# Patient Record
Sex: MALE | Race: WHITE | Employment: OTHER | URBAN - METROPOLITAN AREA
[De-identification: names, ages, dates, MRNs, and addresses within clinical notes are randomized per-mention and may not be internally consistent; named-entity substitution may affect disease eponyms.]

---

## 2022-04-11 ENCOUNTER — HOSPITAL ENCOUNTER (OUTPATIENT)
Dept: REHABILITATION | Age: 82
Discharge: HOME | End: 2022-04-11
Payer: MEDICARE

## 2022-04-11 ENCOUNTER — HOSPITAL ENCOUNTER (OUTPATIENT)
Dept: SURGERY | Age: 82
Discharge: HOME | End: 2022-04-11
Payer: MEDICARE

## 2022-04-11 VITALS
DIASTOLIC BLOOD PRESSURE: 82 MMHG | SYSTOLIC BLOOD PRESSURE: 159 MMHG | HEIGHT: 72 IN | WEIGHT: 216.3 LBS | HEART RATE: 60 BPM | BODY MASS INDEX: 29.3 KG/M2 | OXYGEN SATURATION: 97 % | TEMPERATURE: 97.5 F

## 2022-04-11 DIAGNOSIS — R06.83 SNORING: Primary | ICD-10-CM

## 2022-04-11 LAB
ANION GAP SERPL CALC-SCNC: 0 MMOL/L (ref 7–16)
APTT PPP: 36.5 SEC (ref 24.1–35.1)
ATRIAL RATE: 69 BPM
BASOPHILS # BLD: 0 K/UL (ref 0–0.2)
BASOPHILS NFR BLD: 1 % (ref 0–2)
BUN SERPL-MCNC: 18 MG/DL (ref 8–23)
CALCIUM SERPL-MCNC: 8.7 MG/DL (ref 8.3–10.4)
CALCULATED P AXIS, ECG09: 79 DEGREES
CALCULATED R AXIS, ECG10: 68 DEGREES
CALCULATED T AXIS, ECG11: 49 DEGREES
CHLORIDE SERPL-SCNC: 108 MMOL/L (ref 98–107)
CO2 SERPL-SCNC: 31 MMOL/L (ref 21–32)
CREAT SERPL-MCNC: 0.82 MG/DL (ref 0.8–1.5)
DIAGNOSIS, 93000: NORMAL
DIFFERENTIAL METHOD BLD: ABNORMAL
EOSINOPHIL # BLD: 0.1 K/UL (ref 0–0.8)
EOSINOPHIL NFR BLD: 3 % (ref 0.5–7.8)
ERYTHROCYTE [DISTWIDTH] IN BLOOD BY AUTOMATED COUNT: 13.2 % (ref 11.9–14.6)
EST. AVERAGE GLUCOSE BLD GHB EST-MCNC: 114 MG/DL
GLUCOSE SERPL-MCNC: 88 MG/DL (ref 65–100)
HBA1C MFR BLD: 5.6 % (ref 4.2–6.3)
HCT VFR BLD AUTO: 36 % (ref 41.1–50.3)
HGB BLD-MCNC: 11.9 G/DL (ref 13.6–17.2)
IMM GRANULOCYTES # BLD AUTO: 0 K/UL (ref 0–0.5)
IMM GRANULOCYTES NFR BLD AUTO: 0 % (ref 0–5)
INR PPP: 1.1
LYMPHOCYTES # BLD: 1.1 K/UL (ref 0.5–4.6)
LYMPHOCYTES NFR BLD: 23 % (ref 13–44)
MCH RBC QN AUTO: 32 PG (ref 26.1–32.9)
MCHC RBC AUTO-ENTMCNC: 33.1 G/DL (ref 31.4–35)
MCV RBC AUTO: 96.8 FL (ref 79.6–97.8)
MONOCYTES # BLD: 0.6 K/UL (ref 0.1–1.3)
MONOCYTES NFR BLD: 12 % (ref 4–12)
NEUTS SEG # BLD: 2.8 K/UL (ref 1.7–8.2)
NEUTS SEG NFR BLD: 61 % (ref 43–78)
NRBC # BLD: 0 K/UL (ref 0–0.2)
P-R INTERVAL, ECG05: 168 MS
PLATELET # BLD AUTO: 137 K/UL (ref 150–450)
PMV BLD AUTO: 10.4 FL (ref 9.4–12.3)
POTASSIUM SERPL-SCNC: 4 MMOL/L (ref 3.5–5.1)
PROTHROMBIN TIME: 14.4 SEC (ref 12.6–14.5)
Q-T INTERVAL, ECG07: 436 MS
QRS DURATION, ECG06: 96 MS
QTC CALCULATION (BEZET), ECG08: 467 MS
RBC # BLD AUTO: 3.72 M/UL (ref 4.23–5.6)
SODIUM SERPL-SCNC: 139 MMOL/L (ref 136–145)
VENTRICULAR RATE, ECG03: 69 BPM
WBC # BLD AUTO: 4.6 K/UL (ref 4.3–11.1)

## 2022-04-11 PROCEDURE — 97161 PT EVAL LOW COMPLEX 20 MIN: CPT

## 2022-04-11 PROCEDURE — 83036 HEMOGLOBIN GLYCOSYLATED A1C: CPT

## 2022-04-11 PROCEDURE — 87641 MR-STAPH DNA AMP PROBE: CPT

## 2022-04-11 PROCEDURE — 77030027138 HC INCENT SPIROMETER -A

## 2022-04-11 PROCEDURE — 85610 PROTHROMBIN TIME: CPT

## 2022-04-11 PROCEDURE — 85025 COMPLETE CBC W/AUTO DIFF WBC: CPT

## 2022-04-11 PROCEDURE — 94760 N-INVAS EAR/PLS OXIMETRY 1: CPT

## 2022-04-11 PROCEDURE — 93005 ELECTROCARDIOGRAM TRACING: CPT

## 2022-04-11 PROCEDURE — 85730 THROMBOPLASTIN TIME PARTIAL: CPT

## 2022-04-11 PROCEDURE — 80048 BASIC METABOLIC PNL TOTAL CA: CPT

## 2022-04-11 RX ORDER — CHOLECALCIFEROL (VITAMIN D3) 125 MCG
CAPSULE ORAL
COMMUNITY

## 2022-04-11 RX ORDER — LANOLIN ALCOHOL/MO/W.PET/CERES
1500 CREAM (GRAM) TOPICAL DAILY
COMMUNITY

## 2022-04-11 RX ORDER — ASCORBIC ACID 500 MG
1000 TABLET ORAL DAILY
COMMUNITY
End: 2022-04-26

## 2022-04-11 RX ORDER — CEFAZOLIN SODIUM/WATER 2 G/20 ML
2 SYRINGE (ML) INTRAVENOUS ONCE
Status: CANCELLED | OUTPATIENT
Start: 2022-04-11 | End: 2022-04-11

## 2022-04-11 RX ORDER — TAMSULOSIN HYDROCHLORIDE 0.4 MG/1
0.4 CAPSULE ORAL
COMMUNITY

## 2022-04-11 RX ORDER — OFLOXACIN 3 MG/ML
5 SOLUTION AURICULAR (OTIC) DAILY
COMMUNITY

## 2022-04-11 NOTE — PERIOP NOTES
APTT=36.5, anesthesia to review. All other Labs within anesthesia guidelines, no follow-up required. MRSA swab still processing      Recent Results (from the past 12 hour(s))   EKG, 12 LEAD, INITIAL    Collection Time: 04/11/22 12:53 PM   Result Value Ref Range    Ventricular Rate 69 BPM    Atrial Rate 69 BPM    P-R Interval 168 ms    QRS Duration 96 ms    Q-T Interval 436 ms    QTC Calculation (Bezet) 467 ms    Calculated P Axis 79 degrees    Calculated R Axis 68 degrees    Calculated T Axis 49 degrees    Diagnosis       Sinus rhythm with occasional Premature ventricular complexes  Otherwise normal ECG  No previous ECGs available     CBC WITH AUTOMATED DIFF    Collection Time: 04/11/22  1:21 PM   Result Value Ref Range    WBC 4.6 4.3 - 11.1 K/uL    RBC 3.72 (L) 4.23 - 5.6 M/uL    HGB 11.9 (L) 13.6 - 17.2 g/dL    HCT 36.0 (L) 41.1 - 50.3 %    MCV 96.8 79.6 - 97.8 FL    MCH 32.0 26.1 - 32.9 PG    MCHC 33.1 31.4 - 35.0 g/dL    RDW 13.2 11.9 - 14.6 %    PLATELET 489 (L) 076 - 450 K/uL    MPV 10.4 9.4 - 12.3 FL    ABSOLUTE NRBC 0.00 0.0 - 0.2 K/uL    DF AUTOMATED      NEUTROPHILS 61 43 - 78 %    LYMPHOCYTES 23 13 - 44 %    MONOCYTES 12 4.0 - 12.0 %    EOSINOPHILS 3 0.5 - 7.8 %    BASOPHILS 1 0.0 - 2.0 %    IMMATURE GRANULOCYTES 0 0.0 - 5.0 %    ABS. NEUTROPHILS 2.8 1.7 - 8.2 K/UL    ABS. LYMPHOCYTES 1.1 0.5 - 4.6 K/UL    ABS. MONOCYTES 0.6 0.1 - 1.3 K/UL    ABS. EOSINOPHILS 0.1 0.0 - 0.8 K/UL    ABS. BASOPHILS 0.0 0.0 - 0.2 K/UL    ABS. IMM.  GRANS. 0.0 0.0 - 0.5 K/UL   PROTHROMBIN TIME + INR    Collection Time: 04/11/22  1:21 PM   Result Value Ref Range    Prothrombin time 14.4 12.6 - 14.5 sec    INR 1.1     PTT    Collection Time: 04/11/22  1:21 PM   Result Value Ref Range    aPTT 36.5 (H) 24.1 - 14.9 SEC   METABOLIC PANEL, BASIC    Collection Time: 04/11/22  1:21 PM   Result Value Ref Range    Sodium 139 136 - 145 mmol/L    Potassium 4.0 3.5 - 5.1 mmol/L    Chloride 108 (H) 98 - 107 mmol/L    CO2 31 21 - 32 mmol/L    Anion gap 0 (L) 7 - 16 mmol/L    Glucose 88 65 - 100 mg/dL    BUN 18 8 - 23 MG/DL    Creatinine 0.82 0.8 - 1.5 MG/DL    GFR est AA >60 >60 ml/min/1.73m2    GFR est non-AA >60 >60 ml/min/1.73m2    Calcium 8.7 8.3 - 10.4 MG/DL   HEMOGLOBIN A1C WITH EAG    Collection Time: 04/11/22  1:21 PM   Result Value Ref Range    Hemoglobin A1c 5.6 4.20 - 6.30 %    Est. average glucose 114 mg/dL

## 2022-04-11 NOTE — PROGRESS NOTES
Krystina Garcia  : [birth year](59 y.o.) [address] at [address], [address] [place] [address]  [phone]       Physical Therapy Prehab Plan of Treatment and Evaluation Summary:2022    ICD-10: Treatment Diagnosis:   · Pain in Left Knee (M25.562)  · Stiffness of Left Knee, Not elsewhere classified (Z56.867)  Precautions/Allergies:   Patient has no known allergies. MEDICAL/REFERRING DIAGNOSIS:  Unilateral primary osteoarthritis, left knee [M17.12]  REFERRING PHYSICIAN: Alex Estevez MD  DATE OF SURGERY: 22    Assessment:   Comments:  Scheduled for L TKA. Independent with gait and ADLs. Will discharge home with support of spouse. PROBLEM LIST (Impacting functional limitations):  Mr. Shonda Zuleta presents with the following left lower extremity(s) problems:  1. Gait  2. Strength  3. Range of Motion  4. Home Exercise Program  5. Pain   INTERVENTIONS PLANNED:  1. Home Exercise Program  2. Educational Discussion      TREATMENT PLAN: Effective Dates: 2022 TO 2022. Frequency/Duration: Patient to continue to perform home exercise program at least twice per day up until his surgery. GOALS: (Goals have been discussed and agreed upon with patient.)  Discharge Goals: Time Frame: 1 Day  1. Patient will demonstrate independence with a home exercise program designed to increase functional technique and pain control to minimize functional deficits and optimize patient for total joint replacement. Rehabilitation Potential For Stated Goals: Good  Regarding Hortencia Fabian's therapy, I certify that the treatment plan above will be carried out by a therapist or under their direction.   Thank you for this referral,  Claudene March, PT               HISTORY:   Present Symptoms:  Pain Intensity 1: 5  Pain Location 1: Knee  Pain Orientation 1: Left   History of Present Injury/Illness (Reason for Referral):  Medical/Referring Diagnosis: Unilateral primary osteoarthritis, left knee [M17.12]   Past Medical History/Comorbidities:   Mr. Gemini Dwyer  has a past medical history of A-fib [facility]), Frequent urination, and Hiatal hernia. Mr. Gemini Dwyer  has a past surgical history that includes hx back surgery; hx heart catheterization (2008); hx heart catheterization (2018); hx myringotomy; hx implantable loop recorder (03/24/2021); hx tonsil and adenoidectomy; and hx vasectomy.   Social History/Living Environment:   Home Environment: Private residence  # Steps to Enter: 1  Rails to Enter: No  One/Two Story Residence: Two story  # of Interior Steps: [address]: Right  Living Alone: No  Support Systems: Spouse/Significant Other  Patient Expects to be Discharged to[de-identified] Home with home health  Current DME Used/Available at Home: Walker (lift recliner)  Tub or Shower Type: Tub/Shower combination    Work/Activity:  retired  Dominant Side:  RIGHT  Current Medications:  See Pre-assessment nursing note   Number of Personal Factors/Comorbidities that affect the Plan of Care: 0: LOW COMPLEXITY   EXAMINATION:   ADLs (Current Functional Status):   Ambulation:  [x] Independent  [] Walk Indoors Only  [] Walk Outdoors  [] Use Assistive Device  [] Use Wheelchair Only Dressing:  [x] [address] from Someone for:  [] Sock/Shoes  [] Pants  [] Everything   Bathing/Showering:   [x] Independent  [] Requires Assistance from Someone  [] [address] Only Household Activities:  [] Routine house and yard work  [x] [employer] and yardwork   [] None   Observation/Orthostatic Postural Assessment:       ROM/Flexibility:   AROM: Generally decreased, functional                LLE AROM  L Knee Flexion: 110  L Knee Extension: -10          Strength:   Strength: Generally decreased, functional                  Functional Mobility:         Stand to Sit: Independent,Modified independent,Additional time  Sit to Stand: Independent,Modified independent,Additional time  Distance (ft): 150 Feet (ft)  Ambulation - Level of Assistance: Independent; Additional time  Speed/Suri: Slow  Stance: Left decreased  Gait Abnormalities: Antalgic;Decreased step clearance          Balance:    Sitting: Intact  Standing: Intact   Body Structures Involved:  1. Bones  2. Joints  3. Muscles Body Functions Affected:  1. Neuromusculoskeletal  2. Movement Related Activities and Participation Affected:  1. General Tasks and Demands  2. Mobility   Number of elements that affect the Plan of Care: 3: MODERATE COMPLEXITY   CLINICAL PRESENTATION:   Presentation: Stable and uncomplicated: LOW COMPLEXITY   CLINICAL DECISION MAKING:   Tool Used: Knee injury and Osteoarthritis Outcome Score for Joint Replacement (KOOS, JR)  Score:  Initial: 16 Most Recent: TBD   Interpretation of Score: The KOOS, JR contains 7 items from the original KOOS survey. Items are coded from 0 to 4, none to extreme respectively. Dannielle Womack is scored by summing the raw response (range 0-28) and then converting it to an interval score using the table provided below. The interval score ranges from 0 to 100 where 0 represents total knee disability and 100 represents perfect knee health. Medical Necessity:   · Mr. Gwendolyn He is expected to optimize his lower extremity strength and ROM in preparation for joint replacement surgery. Reason for Services/Other Comments:  · Achieve baseline assesment of musculoskeletal system, functional mobility and home environment. , educate in PT HEP in preparation for surgery, educate in hospital plan of care. Use of outcome tool(s) and clinical judgement create a POC that gives a: Clear prediction of patient's progress: LOW COMPLEXITY   TREATMENT:   Treatment/Session Assessment:  Patient was instructed in PT- HEP to increase strength and ROM in LEs. Answered all questions. · Post session pain:  5  · Compliance with Program/Exercises: anticipate compliance.   Total Treatment Duration:  PT Patient Time In/Time Out  Time In: 1315  Time Out: Gabriela 60 Simona Calhoun

## 2022-04-11 NOTE — PROGRESS NOTES
04/11/22 1300   Oxygen Therapy   O2 Sat (%) 98 %   Pulse via Oximetry 63 beats per minute   O2 Device None (Room air)   Pre-Treatment   Breath Sounds Bilateral Clear;Diminished   Pre FEV1 (liters) 2.4 liters   % Predicted 77   Pt's symptoms include:    Snoring  Tiredness- excessive daytime sleepiness  Observed apnea  Neck size     41.5         cm  Modified Melendez stage 5  SACS Score 14  STOP BANG 5  Height   6   '  0  \"   Weight   216  lbs  BMI 29.34      Refer patient for sleep study based on above assessment. [facility]  Phone number:  [phone]    Initial respiratory Assessment completed with pt. Pt was interviewed and evaluated in Joint camp prior to surgery. Patient ID:  Caterina Covert  [id number]  9 y.o.  1940  Surgeon: Dr. Shwetha Peralta  Date of Surgery: 4/25/2022  Procedure: Total Left Knee Arthroplasty  Primary Care Physician: Fantasma Rivera [phone]  Specialists:     Pt taught proper COUGH technique  DIAPHRAGMATIC BREATHING EXERCISE INSTRUCTIONS GIVEN    History of smoking:   DENIES                 Quit date:         Secondhand smoke:FATHER    Past procedures with Oxygen desaturation or delayed awakening:DENIES    Past Medical History:   Diagnosis Date    A-fib [facility])     cardioversion    Frequent urination     taking flomax    Hiatal hernia         Respiratory history:DENIES SOB                                                                  Respiratory meds:  DENIES    FAMILY PRESENT:           WIFE  PAST SLEEP STUDY:                   DENIES  HX OF JACK:                                          DENIES  JACK assessment:                                               SLEEPS ON SIDE        PHYSICAL EXAM   Body mass index is 29.34 kg/m².    Visit Vitals  BP (!) 159/82 (BP 1 Location: Left upper arm, BP Patient Position: At rest;Sitting)   Pulse 60   Temp 97.5 °F (36.4 °C)   Ht 6' (1.829 m)   Wt 98.1 kg (216 lb 4.8 oz)   SpO2 97%   BMI 29.34 kg/m²     Neck circumference:  41.5 cm    Loud snoring:                                                 YES             Witnessed apnea or wakening gasping or choking:           APNEA  Awakens with headaches:                                               DENIES  Morning or daytime tiredness/ sleepiness:                          TIRED  Dry mouth or sore throat in morning:            YES                                               Melendez stage:  4                                   SACS score:14  Stop Bang   STOP-BANG  Does the patient snore loudly (louder than talking or loud enough to be heard through closed doors)?: Yes  Does the patient often feel tired, fatigued, or sleepy during the daytime, even after a \"good\" night's sleep?: Yes  Has anyone ever observed the patient stop breathing during their sleep? : Yes  Does the patient have or are they being treated for high blood pressure?: No  Is the patient's BMI greater than 35?: No  Is your neck circumference greater than 17 inches (Male) or 16 inches (Female)?: No  Is the patient older than 48?: Yes  Is the patient male?: Yes  JACK Score: 5  Has the patient been referred to Sleep Medicine?: Yes  Has the patient previously been diagnosed with Obstructive Sleep Apnea?: No                                  CS [facility]  RESPIRATORY ASSESSMENT Q SHIFT   O2 PRN    ALBUTEROL  NEBULIZER Q6 PRN WHEEZING                                          Referrals:  [facility]  Pt.  Phone Number:

## 2022-04-11 NOTE — PERIOP NOTES
PLEASE CONTINUE TAKING ALL PRESCRIPTION MEDICATIONS UP TO THE DAY OF SURGERY UNLESS OTHERWISE DIRECTED BELOW. DISCONTINUE all vitamins and supplements 21 days prior to surgery. DISCONTINUE Non-Steriodal Anti-Inflammatory (NSAIDS) such as Advil and Aleve 5 days prior to surgery. Home Medications to take  the day of surgery    Cetirizine, Metoprolol, Aspirin 81 mg           Home Medications   to Hold   Hold Eliquis for 72 hours prior to surgery (last dose 4/21/22). Take Aspirin 81 mg daily while you hold Eliquis. Comments   Bring to hospital: incentive spirometer, Eli hex soap   On the day before surgery (4/24/22) please take Acetaminophen 2 tablets in the morning and 2 tablets before bed. Please do not bring home medications with you on the day of surgery unless otherwise directed by your nurse. If you are instructed to bring home medications, please give them to your nurse as they will be administered by the nursing staff. If you have any questions, please call [facility] [phone]. A copy of this note was provided to the patient for reference.

## 2022-04-11 NOTE — PERIOP NOTES
The following records have been requested from [address] [place] [address] Drive:       [facility]    Please send EKG, ECHO, Stress, and last office visit via fax to [phone]. Also patient is scheduled for spinal anesthesia which requires holding Eliquis for 72 hours prior. Request to hold Eliquis for 72 hours prior to surgery- please send via fax to [phone]. Thank you!

## 2022-04-11 NOTE — PERIOP NOTES
Your patient recently had labs drawn during a hospital appointment due to an upcoming surgery. The results are attached. If you have any questions or concerns please reach out to your patient for a follow-up in your office. Please do not respond to this message as my mailbox is not monitored. You may call [phone] with questions or concerns. Recent Results (from the past 12 hour(s))   EKG, 12 LEAD, INITIAL    Collection Time: 04/11/22 12:53 PM   Result Value Ref Range    Ventricular Rate 69 BPM    Atrial Rate 69 BPM    P-R Interval 168 ms    QRS Duration 96 ms    Q-T Interval 436 ms    QTC Calculation (Bezet) 467 ms    Calculated P Axis 79 degrees    Calculated R Axis 68 degrees    Calculated T Axis 49 degrees    Diagnosis       Sinus rhythm with occasional Premature ventricular complexes  Otherwise normal ECG  No previous ECGs available     CBC WITH AUTOMATED DIFF    Collection Time: 04/11/22  1:21 PM   Result Value Ref Range    WBC 4.6 4.3 - 11.1 K/uL    RBC 3.72 (L) 4.23 - 5.6 M/uL    HGB 11.9 (L) 13.6 - 17.2 g/dL    HCT 36.0 (L) 41.1 - 50.3 %    MCV 96.8 79.6 - 97.8 FL    MCH 32.0 26.1 - 32.9 PG    MCHC 33.1 31.4 - 35.0 g/dL    RDW 13.2 11.9 - 14.6 %    PLATELET 881 (L) 048 - 450 K/uL    MPV 10.4 9.4 - 12.3 FL    ABSOLUTE NRBC 0.00 0.0 - 0.2 K/uL    DF AUTOMATED      NEUTROPHILS 61 43 - 78 %    LYMPHOCYTES 23 13 - 44 %    MONOCYTES 12 4.0 - 12.0 %    EOSINOPHILS 3 0.5 - 7.8 %    BASOPHILS 1 0.0 - 2.0 %    IMMATURE GRANULOCYTES 0 0.0 - 5.0 %    ABS. NEUTROPHILS 2.8 1.7 - 8.2 K/UL    ABS. LYMPHOCYTES 1.1 0.5 - 4.6 K/UL    ABS. MONOCYTES 0.6 0.1 - 1.3 K/UL    ABS. EOSINOPHILS 0.1 0.0 - 0.8 K/UL    ABS. BASOPHILS 0.0 0.0 - 0.2 K/UL    ABS. IMM.  GRANS. 0.0 0.0 - 0.5 K/UL   PROTHROMBIN TIME + INR    Collection Time: 04/11/22  1:21 PM   Result Value Ref Range    Prothrombin time 14.4 12.6 - 14.5 sec    INR 1.1     PTT    Collection Time: 04/11/22  1:21 PM   Result Value Ref Range    aPTT 36.5 (H) 24.1 - 35.1 SEC   METABOLIC PANEL, BASIC    Collection Time: 04/11/22  1:21 PM   Result Value Ref Range    Sodium 139 136 - 145 mmol/L    Potassium 4.0 3.5 - 5.1 mmol/L    Chloride 108 (H) 98 - 107 mmol/L    CO2 31 21 - 32 mmol/L    Anion gap 0 (L) 7 - 16 mmol/L    Glucose 88 65 - 100 mg/dL    BUN 18 8 - 23 MG/DL    Creatinine 0.82 0.8 - 1.5 MG/DL    GFR est AA >60 >60 ml/min/1.73m2    GFR est non-AA >60 >60 ml/min/1.73m2    Calcium 8.7 8.3 - 10.4 MG/DL   HEMOGLOBIN A1C WITH EAG    Collection Time: 04/11/22  1:21 PM   Result Value Ref Range    Hemoglobin A1c 5.6 4.20 - 6.30 %    Est. average glucose 114 mg/dL

## 2022-04-11 NOTE — PERIOP NOTES
Patient verified name and . Order for consent for Robotic assisted Left total knee arthroplasty IS found in EHR and matches case posting; patient verified. Type 3 surgery, joint camp assessment complete. Labs per surgeon: CBC,BMP, PT/PTT, A1c, MRSA swab ; results processing  Labs per anesthesia protocol: no additional  EKG: done today, within anesthesia protocols. ECHO (3/16/22) and stress test (3/16/22) requested from [facility]. Charge RN to follow up. Request for Eliquis 72 hour clearance sent to Dr. Roberto Salas at [facility]. Charge RN to follow up. Patient states a history of lumbar fusion surgery with rods and screws. No imaging available for review. MRSA/MSSA swab collected; pharmacy to review and dose antibiotic as appropriate. Hospital approved surgical skin cleanser and instructions to return bottle on DOS given per hospital policy. Patient provided with handouts including Guide to Surgery, Pain Management, Hand Hygiene, Blood Transfusion Education, and [facility] Brochure. Patient answered medical/surgical history questions at their best of ability. All prior to admission medications documented in [facility]. Original medication prescription bottles were not visualized during patient appointment. Patient instructed to hold all vitamins 3 weeks prior to surgery and NSAIDS 5 days prior to surgery. Patient teach back successful and patient demonstrates knowledge of instruction.

## 2022-04-12 PROBLEM — R06.83 SNORING: Status: ACTIVE | Noted: 2022-04-12

## 2022-04-12 LAB
BACTERIA SPEC CULT: ABNORMAL
SERVICE CMNT-IMP: ABNORMAL

## 2022-04-12 NOTE — ADVANCED PRACTICE NURSE
Total Joint Surgery Preoperative Chart Review      Patient ID:  Derrick Rossi  [id number]  72 y.o.  1940  Surgeon: Dr. Jeannine Quiles  Date of Surgery: 4/25/2022  Procedure: Total Left Knee Arthroplasty  Primary Care Physician: Carlos Baig [phone]  Specialty Physician(s):      Subjective:   Derrick Rossi is a 81 y.o. WHITE/NON- male who presents for preoperative evaluation for Total Left Knee arthroplasty. This is a preoperative chart review note based on data collected by the nurse at the surgical Pre-Assessment visit. Past Medical History:   Diagnosis Date    A-fib [facility])     cardioversion    Frequent urination     taking flomax    Hiatal hernia       Past Surgical History:   Procedure Laterality Date    HX BACK SURGERY      lumbar fusion    HX HEART CATHETERIZATION  2008    HX HEART CATHETERIZATION  2018    left heart cath femoral approach, right heart cath    HX IMPLANTABLE LOOP RECORDER  03/24/2021    Dr. Meet Méndez HX MYRINGOTOMY      w/ tubes    HX TONSIL AND ADENOIDECTOMY      HX VASECTOMY       No family history on file. Social History     Tobacco Use    Smoking status: Never Smoker    Smokeless tobacco: Never Used   Substance Use Topics    Alcohol use: Yes     Alcohol/week: 1.0 - 2.0 standard drink     Types: 1 - 2 Glasses of wine per week       Prior to Admission medications    Medication Sig Start Date End Date Taking? Authorizing Provider   tamsulosin (FLOMAX) 0.4 mg capsule Take 0.4 mg by mouth nightly. Yes Provider, Historical   ofloxacin (FLOXIN) 0.3 % otic solution Administer 5 Drops in left ear daily. Yes Provider, Historical   multivit-minerals/folic acid (CENTRUM ADULT 50 FRESH-FRUITY PO) Take  by mouth. Yes Provider, Historical   cholecalciferol, vitamin D3, (Vitamin D3) 50 mcg (2,000 unit) tab Take  by mouth. Yes Provider, Historical   cyanocobalamin (Vitamin B-12) 1,000 mcg tablet Take 1,500 mcg by mouth daily.    Yes Provider, Historical   ascorbic acid, vitamin C, (Vitamin C) 500 mg tablet Take 1,000 mg by mouth daily. Yes Provider, Historical   zinc 50 mg tab tablet Take  by mouth daily. Yes Provider, Historical   ginkgo biloba 60 mg cap Take  by mouth daily. Yes Provider, Historical   cetirizine HCl (CETIRIZINE OP) cetirizine Take No date recorded No form recorded No frequency recorded No route recorded No set duration recorded No set duration amount recorded active No dosage strength recorded No dosage strength units of measure recorded   Yes Provider, Historical   apixaban (Eliquis) 5 mg tablet two (2) times a day. 7/14/21  Yes Provider, Historical   atorvastatin (LIPITOR) 20 mg tablet nightly. Yes Provider, Historical   metoprolol tartrate (LOPRESSOR) 50 mg tablet Take 25 mg by mouth two (2) times a day. 11/9/21 11/9/22 Yes Provider, Historical   DISABLED PLACARD (DISABLED PLACARD) DMV The orthopedic condition creates a substantial limitation in routine walking.     Permanent    MD Sc MPF#[id number] 3/8/15  Yes Farida Treviño MD   clotrimazole-betamethasone (LOTRISONE) topical cream clotrimazole-betamethasone 1 %-0.05 % topical cream  Patient not taking: Reported on 4/11/2022    Provider, Historical     No Known Allergies       Objective:     Physical Exam:   Patient Vitals for the past 24 hrs:   Temp Pulse BP SpO2   04/11/22 1323 97.5 °F (36.4 °C) 60 (!) 159/82 97 %   04/11/22 1300 -- -- -- 98 %       ECG:    EKG Results     Procedure 720 Value Units Date/Time    EKG, 12 LEAD, INITIAL [[id number]] Collected: 04/11/22 1253    Order Status: Completed Updated: 04/11/22 1459     Ventricular Rate 69 BPM      Atrial Rate 69 BPM      P-R Interval 168 ms      QRS Duration 96 ms      Q-T Interval 436 ms      QTC Calculation (Bezet) 467 ms      Calculated P Axis 79 degrees      Calculated R Axis 68 degrees      Calculated T Axis 49 degrees      Diagnosis --     Sinus rhythm with occasional Premature ventricular complexes  Otherwise normal ECG  No previous ECGs available  Confirmed by [facility]  MD ()NORAH ([id number]) on 4/11/2022 2:58:51 PM            Data Review:   Labs:   Recent Results (from the past 24 hour(s))   EKG, 12 LEAD, INITIAL    Collection Time: 04/11/22 12:53 PM   Result Value Ref Range    Ventricular Rate 69 BPM    Atrial Rate 69 BPM    P-R Interval 168 ms    QRS Duration 96 ms    Q-T Interval 436 ms    QTC Calculation (Bezet) 467 ms    Calculated P Axis 79 degrees    Calculated R Axis 68 degrees    Calculated T Axis 49 degrees    Diagnosis       Sinus rhythm with occasional Premature ventricular complexes  Otherwise normal ECG  No previous ECGs available  Confirmed by [facility]  MD ()NORAH ([id number]) on 4/11/2022 2:58:51 PM     CBC WITH AUTOMATED DIFF    Collection Time: 04/11/22  1:21 PM   Result Value Ref Range    WBC 4.6 4.3 - 11.1 K/uL    RBC 3.72 (L) 4.23 - 5.6 M/uL    HGB 11.9 (L) 13.6 - 17.2 g/dL    HCT 36.0 (L) 41.1 - 50.3 %    MCV 96.8 79.6 - 97.8 FL    MCH 32.0 26.1 - 32.9 PG    MCHC 33.1 31.4 - 35.0 g/dL    RDW 13.2 11.9 - 14.6 %    PLATELET 612 (L) 537 - 450 K/uL    MPV 10.4 9.4 - 12.3 FL    ABSOLUTE NRBC 0.00 0.0 - 0.2 K/uL    DF AUTOMATED      NEUTROPHILS 61 43 - 78 %    LYMPHOCYTES 23 13 - 44 %    MONOCYTES 12 4.0 - 12.0 %    EOSINOPHILS 3 0.5 - 7.8 %    BASOPHILS 1 0.0 - 2.0 %    IMMATURE GRANULOCYTES 0 0.0 - 5.0 %    ABS. NEUTROPHILS 2.8 1.7 - 8.2 K/UL    ABS. LYMPHOCYTES 1.1 0.5 - 4.6 K/UL    ABS. MONOCYTES 0.6 0.1 - 1.3 K/UL    ABS. EOSINOPHILS 0.1 0.0 - 0.8 K/UL    ABS. BASOPHILS 0.0 0.0 - 0.2 K/UL    ABS. IMM.  GRANS. 0.0 0.0 - 0.5 K/UL   PROTHROMBIN TIME + INR    Collection Time: 04/11/22  1:21 PM   Result Value Ref Range    Prothrombin time 14.4 12.6 - 14.5 sec    INR 1.1     PTT    Collection Time: 04/11/22  1:21 PM   Result Value Ref Range    aPTT 36.5 (H) 24.1 - 96.0 SEC   METABOLIC PANEL, BASIC    Collection Time: 04/11/22  1:21 PM   Result Value Ref Range    Sodium 139 136 - 145 mmol/L    Potassium 4.0 3.5 - 5.1 mmol/L    Chloride 108 (H) 98 - 107 mmol/L    CO2 31 21 - 32 mmol/L    Anion gap 0 (L) 7 - 16 mmol/L    Glucose 88 65 - 100 mg/dL    BUN 18 8 - 23 MG/DL    Creatinine 0.82 0.8 - 1.5 MG/DL    GFR est AA >60 >60 ml/min/1.73m2    GFR est non-AA >60 >60 ml/min/1.73m2    Calcium 8.7 8.3 - 10.4 MG/DL   HEMOGLOBIN A1C WITH EAG    Collection Time: 04/11/22  1:21 PM   Result Value Ref Range    Hemoglobin A1c 5.6 4.20 - 6.30 %    Est. average glucose 114 mg/dL   MSSA/MRSA SC BY PCR, NASAL SWAB    Collection Time: 04/11/22  1:21 PM    Specimen: Nasal swab   Result Value Ref Range    Special Requests: NO SPECIAL REQUESTS      Culture result: (A)       MRSA target DNA detected, SA target DNA detected. A positive test result does not necessarily indicate the presence of viable organisms. It is however, presumptive for the presence of MRSA or SA. Problem List:  )  Patient Active Problem List   Diagnosis Code    Snoring R06.83       Total Joint Surgery Pre-Assessment Recommendations:           Patient reports the symptoms of snoring, fatigue, observed apnea and /or excessive daytime sleepiness. Will refer patient for HST based on above assessment. Recommend continuous saturation monitoring hours of sleep, during hospitalization.     Signed By: ATIYA Lockwood    April 12, 2022

## 2022-04-12 NOTE — PERIOP NOTES
The following records have been requested from [address] [place] [address] Drive:       [facility]    Patient is scheduled for spinal anesthesia which requires holding Eliquis for 72 hours prior. Request to hold Eliquis for 72 hours prior to surgery- please send via fax to [phone]. Previous correspondence from [place], [place], on 4/11/22 approved a 48 hour hold but anesthesia would like to request clearance for Eliquis 72 hour hold prior to surgery. Please send via fax to [phone]. Thank you!

## 2022-04-20 NOTE — H&P (VIEW-ONLY)
[id number] [facility]  Pre Operative History and Physical Exam    Patient ID:  Christoph Dickinson  [id number]  9 y.o.  1940    Today: April 20, 2022           CC: Left knee pain    HPI:   The patient has end stage arthritis of the left knee. The patient was evaluated and examined during a consultation prior to this office visit. There have been no changes to the patient's orthopedic condition since the initial consultation. The patient has failed previous conservative treatment for this condition including antiinflammatories , and lifestyle modifications. The necessity for joint replacement is present. The patient will be admitted the day of surgery for left knee replacement    Past Medical/Surgical History:  Past Medical History:   Diagnosis Date    A-fib [facility])     cardioversion    Frequent urination     taking flomax    Hiatal hernia      Past Surgical History:   Procedure Laterality Date    HX BACK SURGERY      lumbar fusion    HX HEART CATHETERIZATION  2008    HX HEART CATHETERIZATION  2018    left heart cath femoral approach, right heart cath    HX IMPLANTABLE LOOP RECORDER  03/24/2021    Dr. Ita Roach HX MYRINGOTOMY      w/ tubes    HX TONSIL AND ADENOIDECTOMY      HX VASECTOMY          Allergies: No Known Allergies     Physical Exam:   General: NAD, Alert, Oriented, Appears their stated age     [de-identified]: NC/AT    Skin: No rashes, lesions or wounds seen      Psych: normal affect      Heart: Regular Rate, Rhythm     Lungs: unlabored respirations, no wheezing    Abdomen: Soft and non-distended     Ortho: Pain with limited ROM of the left knee    Neuro: no focal defects, moving extremities equally    Lymph: no lymphadenopathy     Meds:   Current Outpatient Medications   Medication Sig    tamsulosin (FLOMAX) 0.4 mg capsule Take 0.4 mg by mouth nightly.  ofloxacin (FLOXIN) 0.3 % otic solution Administer 5 Drops in left ear daily.     multivit-minerals/folic acid (CENTRUM ADULT 50 FRESH-FRUITY PO) Take  by mouth.  cholecalciferol, vitamin D3, (Vitamin D3) 50 mcg (2,000 unit) tab Take  by mouth.  cyanocobalamin (Vitamin B-12) 1,000 mcg tablet Take 1,500 mcg by mouth daily.  ascorbic acid, vitamin C, (Vitamin C) 500 mg tablet Take 1,000 mg by mouth daily.  zinc 50 mg tab tablet Take  by mouth daily.  ginkgo biloba 60 mg cap Take  by mouth daily.  cetirizine HCl (CETIRIZINE OP) cetirizine Take No date recorded No form recorded No frequency recorded No route recorded No set duration recorded No set duration amount recorded active No dosage strength recorded No dosage strength units of measure recorded    apixaban (Eliquis) 5 mg tablet two (2) times a day.  atorvastatin (LIPITOR) 20 mg tablet nightly.  clotrimazole-betamethasone (LOTRISONE) topical cream clotrimazole-betamethasone 1 %-0.05 % topical cream (Patient not taking: Reported on 4/11/2022)    metoprolol tartrate (LOPRESSOR) 50 mg tablet Take 25 mg by mouth two (2) times a day.  DISABLED PLACARD (DISABLED PLACARD) DMV The orthopedic condition creates a substantial limitation in routine walking. Permanent    MD Corrales [id number]     No current facility-administered medications for this visit.          Labs:  Hospital Outpatient Visit on 04/11/2022   Component Date Value Ref Range Status    WBC 04/11/2022 4.6  4.3 - 11.1 K/uL Final    RBC 04/11/2022 3.72* 4.23 - 5.6 M/uL Final    HGB 04/11/2022 11.9* 13.6 - 17.2 g/dL Final    HCT 04/11/2022 36.0* 41.1 - 50.3 % Final    MCV 04/11/2022 96.8  79.6 - 97.8 FL Final    MCH 04/11/2022 32.0  26.1 - 32.9 PG Final    MCHC 04/11/2022 33.1  31.4 - 35.0 g/dL Final    RDW 04/11/2022 13.2  11.9 - 14.6 % Final    PLATELET 62/86/0449 769* 150 - 450 K/uL Final    MPV 04/11/2022 10.4  9.4 - 12.3 FL Final    ABSOLUTE NRBC 04/11/2022 0.00  0.0 - 0.2 K/uL Final    **Note: Absolute NRBC parameter is now reported with Hemogram**    DF 04/11/2022 AUTOMATED    Final    NEUTROPHILS 04/11/2022 61  43 - 78 % Final    LYMPHOCYTES 04/11/2022 23  13 - 44 % Final    MONOCYTES 04/11/2022 12  4.0 - 12.0 % Final    EOSINOPHILS 04/11/2022 3  0.5 - 7.8 % Final    BASOPHILS 04/11/2022 1  0.0 - 2.0 % Final    IMMATURE GRANULOCYTES 04/11/2022 0  0.0 - 5.0 % Final    ABS. NEUTROPHILS 04/11/2022 2.8  1.7 - 8.2 K/UL Final    ABS. LYMPHOCYTES 04/11/2022 1.1  0.5 - 4.6 K/UL Final    ABS. MONOCYTES 04/11/2022 0.6  0.1 - 1.3 K/UL Final    ABS. EOSINOPHILS 04/11/2022 0.1  0.0 - 0.8 K/UL Final    ABS. BASOPHILS 04/11/2022 0.0  0.0 - 0.2 K/UL Final    ABS. IMM. GRANS. 04/11/2022 0.0  0.0 - 0.5 K/UL Final    Prothrombin time 04/11/2022 14.4  12.6 - 14.5 sec Final    INR 04/11/2022 1.1    Final    Comment: Suggested therapeutic INR range:  Venous thrombosis and embolus  2.0-3.0  Prosthetic heart valve         2.5-3.5  ** Note new reference range and method **      aPTT 04/11/2022 36.5* 24.1 - 35.1 SEC Final    Comment: Heparin Therapeutic Range = 74 - 123 seconds  In addition to factor deficiency, monitoring heparin therapy, etc., evaluation of a prolonged aPTT result should include consideration of preanalytic variables such as heparin flush contamination, specimen integrity issues, etc.      Sodium 04/11/2022 139  136 - 145 mmol/L Final    Potassium 04/11/2022 4.0  3.5 - 5.1 mmol/L Final    Chloride 04/11/2022 108* 98 - 107 mmol/L Final    CO2 04/11/2022 31  21 - 32 mmol/L Final    Anion gap 04/11/2022 0* 7 - 16 mmol/L Final    Glucose 04/11/2022 88  65 - 100 mg/dL Final    Comment: 47 - 60 mg/dl Consistent with, but not fully diagnostic of hypoglycemia.   101 - 125 mg/dl Impaired fasting glucose/consistent with pre-diabetes mellitus  > 126 mg/dl Fasting glucose consistent with overt diabetes mellitus      BUN 04/11/2022 18  8 - 23 MG/DL Final    Creatinine 04/11/2022 0.82  0.8 - 1.5 MG/DL Final    GFR est AA 04/11/2022 >60  >60 ml/min/1.73m2 Final    GFR est non-AA 04/11/2022 >60 >60 ml/min/1.73m2 Final    Comment: (NOTE)  Estimated GFR is calculated using the Modification of Diet in Renal   Disease (MDRD) Study equation, reported for both  Americans   (GFRAA) and non- Americans (GFRNA), and normalized to 1.73m2   body surface area. The physician must decide which value applies to   the patient. The MDRD study equation should only be used in   individuals age 25 or older. It has not been validated for the   following: pregnant women, patients with serious comorbid conditions,   or on certain medications, or persons with extremes of body size,   muscle mass, or nutritional status.  Calcium 04/11/2022 8.7  8.3 - 10.4 MG/DL Final    Hemoglobin A1c 04/11/2022 5.6  4.20 - 6.30 % Final    Est. average glucose 04/11/2022 114  mg/dL Final    Comment: (NOTE)  The eAG should be interpreted with patient characteristics in mind   since ethnicity, interindividual differences, red cell lifespan,   variation in rates of glycation, etc. may affect the validity of the   calculation.  Special Requests: 04/11/2022 NO SPECIAL REQUESTS    Final    Culture result: 04/11/2022 MRSA target DNA detected, SA target DNA detected. A positive test result does not necessarily indicate the presence of viable organisms. It is however, presumptive for the presence of MRSA or SA. *   Final    Ventricular Rate 04/11/2022 69  BPM Final    Atrial Rate 04/11/2022 69  BPM Final    P-R Interval 04/11/2022 168  ms Final    QRS Duration 04/11/2022 96  ms Final    Q-T Interval 04/11/2022 436  ms Final    QTC Calculation (Bezet) 04/11/2022 467  ms Final    Calculated P Axis 04/11/2022 79  degrees Final    Calculated R Axis 04/11/2022 68  degrees Final    Calculated T Axis 04/11/2022 49  degrees Final    Diagnosis 04/11/2022    Final                    Value:Sinus rhythm with occasional Premature ventricular complexes  Otherwise normal ECG  No previous ECGs available  Confirmed by [facility]  MD (), NORAH ALEJO ([id number]) on 4/11/2022 2:58:51 PM                   Patient Active Problem List   Diagnosis Code    Snoring R06.83         Assessment:   1. Arthritis of the left knee      Plan:    1. Proceed with scheduled left knee replacement      The patient was counseled at length about the risks of richi Covid-19 during their perioperative period and any recovery window from their procedure. The patient was made aware that richi Covid-19  may worsen their prognosis for recovering from their procedure and lend to a higher morbidity and/or mortality risk. All material risks, benefits, and reasonable alternatives including postponing the procedure were discussed. The patient does wish to proceed with the procedure at this time.

## 2022-04-24 ENCOUNTER — ANESTHESIA EVENT (OUTPATIENT)
Dept: SURGERY | Age: 82
End: 2022-04-24
Payer: MEDICARE

## 2022-04-24 NOTE — ANESTHESIA PREPROCEDURE EVALUATION
Relevant Problems   No relevant active problems       Anesthetic History   No history of anesthetic complications            Review of Systems / Medical History  Patient summary reviewed and pertinent labs reviewed    Pulmonary  Within defined limits                 Neuro/Psych   Within defined limits           Cardiovascular            Dysrhythmias : atrial fibrillation      Exercise tolerance: >4 METS  Comments: Stress '22 with mixed ischemia but small area of reversible defect   GI/Hepatic/Renal  Within defined limits              Endo/Other        Arthritis     Other Findings            Physical Exam    Airway  Mallampati: II  TM Distance: 4 - 6 cm  Neck ROM: normal range of motion   Mouth opening: Normal     Cardiovascular    Rhythm: regular  Rate: normal         Dental         Pulmonary  Breath sounds clear to auscultation               Abdominal         Other Findings            Anesthetic Plan    ASA: 3  Anesthesia type: spinal      Post-op pain plan if not by surgeon: peripheral nerve block single    Induction: Intravenous  Anesthetic plan and risks discussed with: Patient

## 2022-04-25 ENCOUNTER — HOSPITAL ENCOUNTER (OUTPATIENT)
Age: 82
Discharge: HOME HEALTH | End: 2022-04-26
Attending: ORTHOPAEDIC SURGERY | Admitting: ORTHOPAEDIC SURGERY
Payer: MEDICARE

## 2022-04-25 ENCOUNTER — HOME HEALTH ADMISSION (OUTPATIENT)
Dept: HOME HEALTH SERVICES | Facility: HOME HEALTH | Age: 82
End: 2022-04-25

## 2022-04-25 ENCOUNTER — ANESTHESIA (OUTPATIENT)
Dept: SURGERY | Age: 82
End: 2022-04-25
Payer: MEDICARE

## 2022-04-25 DIAGNOSIS — Z96.652 STATUS POST LEFT KNEE REPLACEMENT: Primary | ICD-10-CM

## 2022-04-25 PROBLEM — N40.1 BENIGN PROSTATIC HYPERPLASIA WITH URINARY OBSTRUCTION: Status: ACTIVE | Noted: 2022-04-25

## 2022-04-25 PROBLEM — M17.12 OSTEOARTHRITIS OF LEFT KNEE: Status: ACTIVE | Noted: 2022-04-25

## 2022-04-25 PROBLEM — N13.8 BENIGN PROSTATIC HYPERPLASIA WITH URINARY OBSTRUCTION: Status: ACTIVE | Noted: 2022-04-25

## 2022-04-25 PROBLEM — E66.3 OVERWEIGHT WITH BODY MASS INDEX (BMI) 25.0-29.9: Status: ACTIVE | Noted: 2019-01-02

## 2022-04-25 PROBLEM — N40.0 BPH (BENIGN PROSTATIC HYPERPLASIA): Status: ACTIVE | Noted: 2022-04-25

## 2022-04-25 PROBLEM — K44.9 HIATAL HERNIA: Status: ACTIVE | Noted: 2022-04-25

## 2022-04-25 PROBLEM — M17.12 PRIMARY OSTEOARTHRITIS OF LEFT KNEE: Status: ACTIVE | Noted: 2019-09-27

## 2022-04-25 PROBLEM — I48.91 ATRIAL FIBRILLATION (HCC): Status: ACTIVE | Noted: 2022-04-25

## 2022-04-25 PROBLEM — I10 ESSENTIAL (PRIMARY) HYPERTENSION: Status: ACTIVE | Noted: 2020-12-30

## 2022-04-25 PROBLEM — I10 PRIMARY HYPERTENSION: Status: ACTIVE | Noted: 2020-12-30

## 2022-04-25 PROBLEM — I10 PRIMARY HYPERTENSION: Chronic | Status: ACTIVE | Noted: 2020-12-30

## 2022-04-25 LAB — HGB BLD-MCNC: 11.9 G/DL (ref 13.6–17.2)

## 2022-04-25 PROCEDURE — 20985 CPTR-ASST DIR MS PX: CPT | Performed by: ORTHOPAEDIC SURGERY

## 2022-04-25 PROCEDURE — 77030012935 HC DRSG AQUACEL BMS -B: Performed by: ORTHOPAEDIC SURGERY

## 2022-04-25 PROCEDURE — 77030003602 HC NDL NRV BLK BBMI -B: Performed by: ANESTHESIOLOGY

## 2022-04-25 PROCEDURE — 77030003028 HC SUT VCRL J&J -A: Performed by: ORTHOPAEDIC SURGERY

## 2022-04-25 PROCEDURE — 77030039760: Performed by: ORTHOPAEDIC SURGERY

## 2022-04-25 PROCEDURE — 76942 ECHO GUIDE FOR BIOPSY: CPT | Performed by: ORTHOPAEDIC SURGERY

## 2022-04-25 PROCEDURE — 77030019557 HC ELECTRD VES SEAL MEDT -F: Performed by: ORTHOPAEDIC SURGERY

## 2022-04-25 PROCEDURE — 74011250636 HC RX REV CODE- 250/636: Performed by: ANESTHESIOLOGY

## 2022-04-25 PROCEDURE — 74011250637 HC RX REV CODE- 250/637: Performed by: PHYSICIAN ASSISTANT

## 2022-04-25 PROCEDURE — 77030029820: Performed by: ORTHOPAEDIC SURGERY

## 2022-04-25 PROCEDURE — 74011000250 HC RX REV CODE- 250: Performed by: ORTHOPAEDIC SURGERY

## 2022-04-25 PROCEDURE — 77030002912 HC SUT ETHBND J&J -A: Performed by: ORTHOPAEDIC SURGERY

## 2022-04-25 PROCEDURE — 74011250636 HC RX REV CODE- 250/636: Performed by: PHYSICIAN ASSISTANT

## 2022-04-25 PROCEDURE — 77030040922 HC BLNKT HYPOTHRM STRY -A: Performed by: ANESTHESIOLOGY

## 2022-04-25 PROCEDURE — 74011250636 HC RX REV CODE- 250/636: Performed by: ORTHOPAEDIC SURGERY

## 2022-04-25 PROCEDURE — 74011000250 HC RX REV CODE- 250: Performed by: PHYSICIAN ASSISTANT

## 2022-04-25 PROCEDURE — 94760 N-INVAS EAR/PLS OXIMETRY 1: CPT

## 2022-04-25 PROCEDURE — 77030038692 HC WND DEB SYS IRMX -B: Performed by: ORTHOPAEDIC SURGERY

## 2022-04-25 PROCEDURE — 77030029828 HC FEM TIB CKPNT KT DISP STRY -B: Performed by: ORTHOPAEDIC SURGERY

## 2022-04-25 PROCEDURE — 74011250636 HC RX REV CODE- 250/636: Performed by: NURSE ANESTHETIST, CERTIFIED REGISTERED

## 2022-04-25 PROCEDURE — 77030020044 HC CLD THERAPY UNIT -B

## 2022-04-25 PROCEDURE — 77030031139 HC SUT VCRL2 J&J -A: Performed by: ORTHOPAEDIC SURGERY

## 2022-04-25 PROCEDURE — 97161 PT EVAL LOW COMPLEX 20 MIN: CPT

## 2022-04-25 PROCEDURE — C1776 JOINT DEVICE (IMPLANTABLE): HCPCS | Performed by: ORTHOPAEDIC SURGERY

## 2022-04-25 PROCEDURE — 76210000006 HC OR PH I REC 0.5 TO 1 HR: Performed by: ORTHOPAEDIC SURGERY

## 2022-04-25 PROCEDURE — 76060000035 HC ANESTHESIA 2 TO 2.5 HR: Performed by: ORTHOPAEDIC SURGERY

## 2022-04-25 PROCEDURE — 97530 THERAPEUTIC ACTIVITIES: CPT

## 2022-04-25 PROCEDURE — 76010010054 HC POST OP PAIN BLOCK: Performed by: ORTHOPAEDIC SURGERY

## 2022-04-25 PROCEDURE — 97535 SELF CARE MNGMENT TRAINING: CPT

## 2022-04-25 PROCEDURE — 76010000171 HC OR TIME 2 TO 2.5 HR INTENSV-TIER 1: Performed by: ORTHOPAEDIC SURGERY

## 2022-04-25 PROCEDURE — 27447 TOTAL KNEE ARTHROPLASTY: CPT | Performed by: ORTHOPAEDIC SURGERY

## 2022-04-25 PROCEDURE — 77030007880 HC KT SPN EPDRL BBMI -B: Performed by: ANESTHESIOLOGY

## 2022-04-25 PROCEDURE — 74011000258 HC RX REV CODE- 258: Performed by: ORTHOPAEDIC SURGERY

## 2022-04-25 PROCEDURE — 74011000250 HC RX REV CODE- 250: Performed by: NURSE ANESTHETIST, CERTIFIED REGISTERED

## 2022-04-25 PROCEDURE — 27447 TOTAL KNEE ARTHROPLASTY: CPT | Performed by: PHYSICIAN ASSISTANT

## 2022-04-25 PROCEDURE — 77030003665 HC NDL SPN BBMI -A: Performed by: ANESTHESIOLOGY

## 2022-04-25 PROCEDURE — 97165 OT EVAL LOW COMPLEX 30 MIN: CPT

## 2022-04-25 PROCEDURE — 85018 HEMOGLOBIN: CPT

## 2022-04-25 PROCEDURE — 77030006720 HC BLD PAT RMR ZIMM -B: Performed by: ORTHOPAEDIC SURGERY

## 2022-04-25 PROCEDURE — 74011250637 HC RX REV CODE- 250/637: Performed by: NURSE PRACTITIONER

## 2022-04-25 PROCEDURE — C1713 ANCHOR/SCREW BN/BN,TIS/BN: HCPCS | Performed by: ORTHOPAEDIC SURGERY

## 2022-04-25 PROCEDURE — 77030038149 HC BLD SAW SAG STRY -D: Performed by: ORTHOPAEDIC SURGERY

## 2022-04-25 PROCEDURE — 2709999900 HC NON-CHARGEABLE SUPPLY: Performed by: ORTHOPAEDIC SURGERY

## 2022-04-25 PROCEDURE — 2709999900 HC NON-CHARGEABLE SUPPLY

## 2022-04-25 DEVICE — CRUCIATE RETAINING FEMORAL
Type: IMPLANTABLE DEVICE | Site: KNEE | Status: FUNCTIONAL
Brand: TRIATHLON

## 2022-04-25 DEVICE — KNEE K2 TOT HEMI ADV CMTLS -- IMPL CAPPED K2: Type: IMPLANTABLE DEVICE | Status: FUNCTIONAL

## 2022-04-25 DEVICE — TIBIAL BEARING INSERT
Type: IMPLANTABLE DEVICE | Site: KNEE | Status: FUNCTIONAL
Brand: TRIATHLON

## 2022-04-25 DEVICE — TIBIAL COMPONENT
Type: IMPLANTABLE DEVICE | Site: KNEE | Status: FUNCTIONAL
Brand: TRIATHLON

## 2022-04-25 DEVICE — PATELLA
Type: IMPLANTABLE DEVICE | Site: KNEE | Status: FUNCTIONAL
Brand: TRIATHLON

## 2022-04-25 DEVICE — CEMENT BNE 20GM HALF DOSE PMMA VISC RADPQ FAST: Type: IMPLANTABLE DEVICE | Site: KNEE | Status: FUNCTIONAL

## 2022-04-25 RX ORDER — SODIUM CHLORIDE 0.9 % (FLUSH) 0.9 %
5-40 SYRINGE (ML) INJECTION EVERY 8 HOURS
Status: DISCONTINUED | OUTPATIENT
Start: 2022-04-25 | End: 2022-04-26 | Stop reason: HOSPADM

## 2022-04-25 RX ORDER — ROPIVACAINE HYDROCHLORIDE 2 MG/ML
INJECTION, SOLUTION EPIDURAL; INFILTRATION; PERINEURAL
Status: COMPLETED | OUTPATIENT
Start: 2022-04-25 | End: 2022-04-25

## 2022-04-25 RX ORDER — DEXAMETHASONE SODIUM PHOSPHATE 100 MG/10ML
10 INJECTION INTRAMUSCULAR; INTRAVENOUS ONCE
Status: DISCONTINUED | OUTPATIENT
Start: 2022-04-26 | End: 2022-04-26 | Stop reason: HOSPADM

## 2022-04-25 RX ORDER — SODIUM CHLORIDE, SODIUM LACTATE, POTASSIUM CHLORIDE, CALCIUM CHLORIDE 600; 310; 30; 20 MG/100ML; MG/100ML; MG/100ML; MG/100ML
INJECTION, SOLUTION INTRAVENOUS
Status: DISCONTINUED | OUTPATIENT
Start: 2022-04-25 | End: 2022-04-25 | Stop reason: HOSPADM

## 2022-04-25 RX ORDER — OXYCODONE HYDROCHLORIDE 5 MG/1
10 TABLET ORAL
Status: DISCONTINUED | OUTPATIENT
Start: 2022-04-25 | End: 2022-04-25 | Stop reason: HOSPADM

## 2022-04-25 RX ORDER — ACETAMINOPHEN 500 MG
1000 TABLET ORAL EVERY 6 HOURS
Status: DISCONTINUED | OUTPATIENT
Start: 2022-04-25 | End: 2022-04-26 | Stop reason: HOSPADM

## 2022-04-25 RX ORDER — ONDANSETRON 4 MG/1
4 TABLET, ORALLY DISINTEGRATING ORAL
Status: DISCONTINUED | OUTPATIENT
Start: 2022-04-25 | End: 2022-04-26 | Stop reason: HOSPADM

## 2022-04-25 RX ORDER — HYDROMORPHONE HYDROCHLORIDE 2 MG/ML
0.5 INJECTION, SOLUTION INTRAMUSCULAR; INTRAVENOUS; SUBCUTANEOUS
Status: DISCONTINUED | OUTPATIENT
Start: 2022-04-25 | End: 2022-04-25 | Stop reason: HOSPADM

## 2022-04-25 RX ORDER — ACETAMINOPHEN 500 MG
1000 TABLET ORAL ONCE
Status: DISCONTINUED | OUTPATIENT
Start: 2022-04-25 | End: 2022-04-25 | Stop reason: HOSPADM

## 2022-04-25 RX ORDER — SODIUM CHLORIDE 0.9 % (FLUSH) 0.9 %
5-40 SYRINGE (ML) INJECTION AS NEEDED
Status: DISCONTINUED | OUTPATIENT
Start: 2022-04-25 | End: 2022-04-25 | Stop reason: HOSPADM

## 2022-04-25 RX ORDER — SODIUM CHLORIDE, SODIUM LACTATE, POTASSIUM CHLORIDE, CALCIUM CHLORIDE 600; 310; 30; 20 MG/100ML; MG/100ML; MG/100ML; MG/100ML
100 INJECTION, SOLUTION INTRAVENOUS CONTINUOUS
Status: DISCONTINUED | OUTPATIENT
Start: 2022-04-25 | End: 2022-04-25 | Stop reason: HOSPADM

## 2022-04-25 RX ORDER — ROPIVACAINE HYDROCHLORIDE 2 MG/ML
INJECTION, SOLUTION EPIDURAL; INFILTRATION; PERINEURAL AS NEEDED
Status: DISCONTINUED | OUTPATIENT
Start: 2022-04-25 | End: 2022-04-25 | Stop reason: HOSPADM

## 2022-04-25 RX ORDER — CEFAZOLIN SODIUM/WATER 2 G/20 ML
2 SYRINGE (ML) INTRAVENOUS EVERY 8 HOURS
Status: COMPLETED | OUTPATIENT
Start: 2022-04-25 | End: 2022-04-25

## 2022-04-25 RX ORDER — HYDROMORPHONE HYDROCHLORIDE 1 MG/ML
1 INJECTION, SOLUTION INTRAMUSCULAR; INTRAVENOUS; SUBCUTANEOUS
Status: DISCONTINUED | OUTPATIENT
Start: 2022-04-25 | End: 2022-04-26 | Stop reason: HOSPADM

## 2022-04-25 RX ORDER — TRANEXAMIC ACID 100 MG/ML
INJECTION, SOLUTION INTRAVENOUS AS NEEDED
Status: DISCONTINUED | OUTPATIENT
Start: 2022-04-25 | End: 2022-04-25 | Stop reason: HOSPADM

## 2022-04-25 RX ORDER — SODIUM CHLORIDE 0.9 % (FLUSH) 0.9 %
5-40 SYRINGE (ML) INJECTION EVERY 8 HOURS
Status: DISCONTINUED | OUTPATIENT
Start: 2022-04-25 | End: 2022-04-25 | Stop reason: HOSPADM

## 2022-04-25 RX ORDER — TAMSULOSIN HYDROCHLORIDE 0.4 MG/1
0.4 CAPSULE ORAL
Status: DISCONTINUED | OUTPATIENT
Start: 2022-04-25 | End: 2022-04-26 | Stop reason: HOSPADM

## 2022-04-25 RX ORDER — KETOROLAC TROMETHAMINE 30 MG/ML
INJECTION, SOLUTION INTRAMUSCULAR; INTRAVENOUS AS NEEDED
Status: DISCONTINUED | OUTPATIENT
Start: 2022-04-25 | End: 2022-04-25 | Stop reason: HOSPADM

## 2022-04-25 RX ORDER — LIDOCAINE HYDROCHLORIDE 10 MG/ML
0.1 INJECTION INFILTRATION; PERINEURAL AS NEEDED
Status: DISCONTINUED | OUTPATIENT
Start: 2022-04-25 | End: 2022-04-25 | Stop reason: HOSPADM

## 2022-04-25 RX ORDER — EPHEDRINE SULFATE/0.9% NACL/PF 50 MG/5 ML
SYRINGE (ML) INTRAVENOUS AS NEEDED
Status: DISCONTINUED | OUTPATIENT
Start: 2022-04-25 | End: 2022-04-25 | Stop reason: HOSPADM

## 2022-04-25 RX ORDER — FENTANYL CITRATE 50 UG/ML
100 INJECTION, SOLUTION INTRAMUSCULAR; INTRAVENOUS ONCE
Status: COMPLETED | OUTPATIENT
Start: 2022-04-25 | End: 2022-04-25

## 2022-04-25 RX ORDER — AMIODARONE HYDROCHLORIDE 200 MG/1
200 TABLET ORAL DAILY
Status: DISCONTINUED | OUTPATIENT
Start: 2022-04-25 | End: 2022-04-25

## 2022-04-25 RX ORDER — ONDANSETRON 2 MG/ML
INJECTION INTRAMUSCULAR; INTRAVENOUS AS NEEDED
Status: DISCONTINUED | OUTPATIENT
Start: 2022-04-25 | End: 2022-04-25 | Stop reason: HOSPADM

## 2022-04-25 RX ORDER — FLUMAZENIL 0.1 MG/ML
0.2 INJECTION INTRAVENOUS
Status: DISCONTINUED | OUTPATIENT
Start: 2022-04-25 | End: 2022-04-25 | Stop reason: HOSPADM

## 2022-04-25 RX ORDER — NALOXONE HYDROCHLORIDE 0.4 MG/ML
.2-.4 INJECTION, SOLUTION INTRAMUSCULAR; INTRAVENOUS; SUBCUTANEOUS
Status: DISCONTINUED | OUTPATIENT
Start: 2022-04-25 | End: 2022-04-26 | Stop reason: HOSPADM

## 2022-04-25 RX ORDER — AMOXICILLIN 250 MG
2 CAPSULE ORAL DAILY
Status: DISCONTINUED | OUTPATIENT
Start: 2022-04-26 | End: 2022-04-26 | Stop reason: HOSPADM

## 2022-04-25 RX ORDER — OXYCODONE HYDROCHLORIDE 5 MG/1
5 TABLET ORAL
Status: DISCONTINUED | OUTPATIENT
Start: 2022-04-25 | End: 2022-04-25 | Stop reason: HOSPADM

## 2022-04-25 RX ORDER — METOPROLOL TARTRATE 25 MG/1
25 TABLET, FILM COATED ORAL EVERY 12 HOURS
Status: DISCONTINUED | OUTPATIENT
Start: 2022-04-25 | End: 2022-04-26 | Stop reason: HOSPADM

## 2022-04-25 RX ORDER — DIPHENHYDRAMINE HYDROCHLORIDE 50 MG/ML
12.5 INJECTION, SOLUTION INTRAMUSCULAR; INTRAVENOUS
Status: DISCONTINUED | OUTPATIENT
Start: 2022-04-25 | End: 2022-04-25 | Stop reason: HOSPADM

## 2022-04-25 RX ORDER — SODIUM CHLORIDE 0.9 % (FLUSH) 0.9 %
5-40 SYRINGE (ML) INJECTION AS NEEDED
Status: DISCONTINUED | OUTPATIENT
Start: 2022-04-25 | End: 2022-04-26 | Stop reason: HOSPADM

## 2022-04-25 RX ORDER — PROPOFOL 10 MG/ML
INJECTION, EMULSION INTRAVENOUS
Status: DISCONTINUED | OUTPATIENT
Start: 2022-04-25 | End: 2022-04-25 | Stop reason: HOSPADM

## 2022-04-25 RX ORDER — SODIUM CHLORIDE 9 MG/ML
100 INJECTION, SOLUTION INTRAVENOUS CONTINUOUS
Status: DISCONTINUED | OUTPATIENT
Start: 2022-04-25 | End: 2022-04-26 | Stop reason: HOSPADM

## 2022-04-25 RX ORDER — ACETAMINOPHEN 650 MG/1
650 SUPPOSITORY RECTAL ONCE
Status: DISCONTINUED | OUTPATIENT
Start: 2022-04-25 | End: 2022-04-25 | Stop reason: SDUPTHER

## 2022-04-25 RX ORDER — DEXAMETHASONE SODIUM PHOSPHATE 4 MG/ML
INJECTION, SOLUTION INTRA-ARTICULAR; INTRALESIONAL; INTRAMUSCULAR; INTRAVENOUS; SOFT TISSUE AS NEEDED
Status: DISCONTINUED | OUTPATIENT
Start: 2022-04-25 | End: 2022-04-25 | Stop reason: HOSPADM

## 2022-04-25 RX ORDER — FLUTICASONE PROPIONATE 50 MCG
2 SPRAY, SUSPENSION (ML) NASAL DAILY
Status: DISCONTINUED | OUTPATIENT
Start: 2022-04-25 | End: 2022-04-26 | Stop reason: HOSPADM

## 2022-04-25 RX ORDER — ASPIRIN 81 MG/1
81 TABLET ORAL EVERY 12 HOURS
Status: COMPLETED | OUTPATIENT
Start: 2022-04-25 | End: 2022-04-25

## 2022-04-25 RX ORDER — ACETAMINOPHEN 325 MG/1
975 TABLET ORAL ONCE
Status: DISCONTINUED | OUTPATIENT
Start: 2022-04-25 | End: 2022-04-25 | Stop reason: SDUPTHER

## 2022-04-25 RX ORDER — NALOXONE HYDROCHLORIDE 0.4 MG/ML
0.2 INJECTION, SOLUTION INTRAMUSCULAR; INTRAVENOUS; SUBCUTANEOUS AS NEEDED
Status: DISCONTINUED | OUTPATIENT
Start: 2022-04-25 | End: 2022-04-25 | Stop reason: HOSPADM

## 2022-04-25 RX ORDER — OXYCODONE HYDROCHLORIDE 5 MG/1
10 TABLET ORAL
Status: DISCONTINUED | OUTPATIENT
Start: 2022-04-25 | End: 2022-04-26 | Stop reason: HOSPADM

## 2022-04-25 RX ORDER — CEFAZOLIN SODIUM/WATER 2 G/20 ML
2 SYRINGE (ML) INTRAVENOUS ONCE
Status: DISCONTINUED | OUTPATIENT
Start: 2022-04-25 | End: 2022-04-25 | Stop reason: HOSPADM

## 2022-04-25 RX ORDER — CEFAZOLIN SODIUM 1 G/3ML
INJECTION, POWDER, FOR SOLUTION INTRAMUSCULAR; INTRAVENOUS AS NEEDED
Status: DISCONTINUED | OUTPATIENT
Start: 2022-04-25 | End: 2022-04-25 | Stop reason: HOSPADM

## 2022-04-25 RX ORDER — VANCOMYCIN/0.9 % SOD CHLORIDE 1.5G/250ML
1500 PLASTIC BAG, INJECTION (ML) INTRAVENOUS ONCE
Status: COMPLETED | OUTPATIENT
Start: 2022-04-25 | End: 2022-04-25

## 2022-04-25 RX ORDER — DIPHENHYDRAMINE HCL 25 MG
25 CAPSULE ORAL
Status: DISCONTINUED | OUTPATIENT
Start: 2022-04-25 | End: 2022-04-26 | Stop reason: HOSPADM

## 2022-04-25 RX ADMIN — ACETAMINOPHEN 1000 MG: 500 TABLET, FILM COATED ORAL at 12:18

## 2022-04-25 RX ADMIN — Medication 10 MG: at 07:38

## 2022-04-25 RX ADMIN — VANCOMYCIN HYDROCHLORIDE 1000 MG: 1 INJECTION, POWDER, LYOPHILIZED, FOR SOLUTION INTRAVENOUS at 20:19

## 2022-04-25 RX ADMIN — SODIUM CHLORIDE, PRESERVATIVE FREE 10 ML: 5 INJECTION INTRAVENOUS at 12:18

## 2022-04-25 RX ADMIN — CEFAZOLIN SODIUM 2 G: 1 INJECTION, POWDER, FOR SOLUTION INTRAMUSCULAR; INTRAVENOUS at 07:08

## 2022-04-25 RX ADMIN — METOPROLOL TARTRATE 25 MG: 25 TABLET, FILM COATED ORAL at 18:02

## 2022-04-25 RX ADMIN — PROPOFOL 75 MCG/KG/MIN: 10 INJECTION, EMULSION INTRAVENOUS at 07:23

## 2022-04-25 RX ADMIN — FENTANYL CITRATE 50 MCG: 50 INJECTION, SOLUTION INTRAMUSCULAR; INTRAVENOUS at 06:52

## 2022-04-25 RX ADMIN — Medication 10 MG: at 07:46

## 2022-04-25 RX ADMIN — SODIUM CHLORIDE, SODIUM LACTATE, POTASSIUM CHLORIDE, AND CALCIUM CHLORIDE 100 ML/HR: 600; 310; 30; 20 INJECTION, SOLUTION INTRAVENOUS at 06:15

## 2022-04-25 RX ADMIN — FLUTICASONE PROPIONATE 2 SPRAY: 50 SPRAY, METERED NASAL at 12:19

## 2022-04-25 RX ADMIN — PHENYLEPHRINE HYDROCHLORIDE 50 MCG: 10 INJECTION INTRAVENOUS at 07:56

## 2022-04-25 RX ADMIN — PHENYLEPHRINE HYDROCHLORIDE 100 MCG: 10 INJECTION INTRAVENOUS at 08:51

## 2022-04-25 RX ADMIN — SODIUM CHLORIDE, SODIUM LACTATE, POTASSIUM CHLORIDE, AND CALCIUM CHLORIDE: 600; 310; 30; 20 INJECTION, SOLUTION INTRAVENOUS at 07:02

## 2022-04-25 RX ADMIN — SODIUM CHLORIDE, PRESERVATIVE FREE 10 ML: 5 INJECTION INTRAVENOUS at 18:04

## 2022-04-25 RX ADMIN — VANCOMYCIN HYDROCHLORIDE 1500 MG: 10 INJECTION, POWDER, LYOPHILIZED, FOR SOLUTION INTRAVENOUS at 06:14

## 2022-04-25 RX ADMIN — OXYCODONE HYDROCHLORIDE 10 MG: 5 TABLET ORAL at 23:53

## 2022-04-25 RX ADMIN — CEFAZOLIN SODIUM 2 G: 10 INJECTION, POWDER, FOR SOLUTION INTRAVENOUS at 12:19

## 2022-04-25 RX ADMIN — OXYCODONE HYDROCHLORIDE 10 MG: 5 TABLET ORAL at 18:02

## 2022-04-25 RX ADMIN — OXYCODONE HYDROCHLORIDE 10 MG: 5 TABLET ORAL at 12:18

## 2022-04-25 RX ADMIN — PHENYLEPHRINE HYDROCHLORIDE 100 MCG: 10 INJECTION INTRAVENOUS at 08:38

## 2022-04-25 RX ADMIN — ONDANSETRON 4 MG: 2 INJECTION INTRAMUSCULAR; INTRAVENOUS at 07:24

## 2022-04-25 RX ADMIN — ACETAMINOPHEN 1000 MG: 500 TABLET, FILM COATED ORAL at 23:53

## 2022-04-25 RX ADMIN — ACETAMINOPHEN 1000 MG: 500 TABLET, FILM COATED ORAL at 18:02

## 2022-04-25 RX ADMIN — SODIUM CHLORIDE, PRESERVATIVE FREE 10 ML: 5 INJECTION INTRAVENOUS at 12:19

## 2022-04-25 RX ADMIN — PHENYLEPHRINE HYDROCHLORIDE 50 MCG: 10 INJECTION INTRAVENOUS at 08:03

## 2022-04-25 RX ADMIN — VANCOMYCIN HYDROCHLORIDE 1500 MG: 10 INJECTION, POWDER, LYOPHILIZED, FOR SOLUTION INTRAVENOUS at 07:05

## 2022-04-25 RX ADMIN — SODIUM CHLORIDE, SODIUM LACTATE, POTASSIUM CHLORIDE, AND CALCIUM CHLORIDE: 600; 310; 30; 20 INJECTION, SOLUTION INTRAVENOUS at 08:00

## 2022-04-25 RX ADMIN — PHENYLEPHRINE HYDROCHLORIDE 100 MCG: 10 INJECTION INTRAVENOUS at 08:30

## 2022-04-25 RX ADMIN — MEPIVACAINE HYDROCHLORIDE 60 MG: 20 INJECTION, SOLUTION EPIDURAL; INFILTRATION at 07:14

## 2022-04-25 RX ADMIN — Medication 1 AMPULE: at 20:19

## 2022-04-25 RX ADMIN — ROPIVACAINE HYDROCHLORIDE 40 MG: 2 INJECTION, SOLUTION EPIDURAL; INFILTRATION at 06:54

## 2022-04-25 RX ADMIN — Medication 81 MG: at 20:19

## 2022-04-25 RX ADMIN — CEFAZOLIN SODIUM 2 G: 10 INJECTION, POWDER, FOR SOLUTION INTRAVENOUS at 18:14

## 2022-04-25 RX ADMIN — PHENYLEPHRINE HYDROCHLORIDE 50 MCG: 10 INJECTION INTRAVENOUS at 08:16

## 2022-04-25 RX ADMIN — TRANEXAMIC ACID 1 G: 100 INJECTION, SOLUTION INTRAVENOUS at 07:17

## 2022-04-25 RX ADMIN — Medication 3 AMPULE: at 06:16

## 2022-04-25 RX ADMIN — DEXAMETHASONE SODIUM PHOSPHATE 5 MG: 4 INJECTION, SOLUTION INTRAMUSCULAR; INTRAVENOUS at 07:24

## 2022-04-25 NOTE — OP NOTES
[facility] Robotic Assisted Total Knee Arthroplasty: Posterior Cruciate Retaining       Patient:Mateo Rivera   : 1940  Medical Record [id number]  Pre-operative Diagnosis:  Primary osteoarthritis of left knee [M17.12]  Post-operative Diagnosis: Primary osteoarthritis of left knee [M17.12]  Location: [address] [place] [address]  Surgeon: Samira Clay MD   Assistant: Chong Morales    Anesthesia: Spinal and ACB    Indications: Patient has end stage arthritis. They have tried and failed conservative management. Procedure:Procedure(s) (LRB):  LEFT ELLI KNEE ARTHROPLASTY TOTAL ROBOTIC ASSISTED/ TIERRA/ ADDUCTOR CANAL BLOCK (Left)            CPT- 69941- Total knee arthroplasty           37750- Other procedures on musculoskeletal system            0055T- Computer assisted surgical navigation   The complexity of the total joint surgery requires the use of a first assistant for positioning, retraction and expertise in closure. Tourniquet Time: 0 minutes  EBL: 250 cc  Findings: severe degenerative arthritis with loss of cartilage in weight bearing compartments of the knee,  patellar osteophytes with loss of patella cartilage, posterior femoral osteophytes   BMI: Body mass index is 28.63 kg/m². James Smith was brought to the operating room and positioned on the operating table. He was anesthestized with anesthesia. IV antibiotics were administered. Prior to the incision being made a timeout was called identifying the patient, procedure ,operative side and surgeon The operative leg was prepped and draped in the usual sterile manner. An anterior longitudinal incision was accomplished just medial to the tibial tubercle and extending approximal 6 centimeters proximal to the superior pole of the patella. A medial parapatellar capsular incision was performed. The medial capsular flap was elevated around to the insertion of the semimembranous tendon.   The patella was everted and the knee flexed and externally rotated. The medial and external menisci were excised. The lateral half of the fat pad excised and the patella femoral ligament was released. The anterior cruciate ligament was resect and the posterior cruciate ligament was retained. The femoral and tibial arrays were pinned in place and registered with the [employer]. The patient landmarks were collected and the tibial and femoral checkpoints were registered and verified. The preresection balancing was performed. The distal femur was addressed first. Utilizing the [facility] robotic arm the distal femoral cut was made. The anterior and posterior cuts were then made. The osteophytes were removed from the tibial and femoral surfaces. The tibia was then addressed. The [employer] robotic arm was then used to make the measured resection of the tibia. The tibia was sized. The tibial base plate was pinned into place with the appropriate external rotation and stem site prepared. A trial femoral component and poly was placed. A preliminary range of motion was accomplished with the trial components. The patient was found to obtain full extension as well as appropriate flexion. The patient's ligaments were stable in flexion and extension to medial and lateral stressing and the alignment was through the appropriate mechanical axis. Additional surgical procedures included: none. The patella was then everted. The bone was resect to accommodate the three peg patellar button. A trial reduction of the patella revealed appropriate tracking through the patellofemoral groove with no lateral retinacular release being accomplished. All trial components were removed. The real implants were opened: Sizes listed below. The knee was irrigated. There were no femoral deficiencies. There were no tibial deficiencies. No augmentation was utilized. The tibial and femoral components were impacted into place.  The patella component was cemented into place. Prince Abudl knee was placed through range of motion and noted to be stable as mentioned above with the trail components. The wound was dry, therefore no drain was used. The operative knee was injected with 60 cc of Naropin, 10 cc's of morphine and 1 cc of 30 mg of Toradol. The knee was then soaked with a diluted betadine solution for approximately 3 min. This was then thoroughly irrigated. The capsular layer was closed using a #1 Stratafix suture. Then, 1 gram (100 mg/ml) of Transexamic Acid was injected into the joint space. The subcutaneous layers were closed using 2-0 Stratafix. Finally the skin was closed using 3-0 Vicryl and staples which were applied in occlusive fashion and sterile bandage applied. An Iceman cryo pad was applied on the operative leg. Sponge count and needle counts were correct. Prince Abdul left the operating room     Implants:   Implant Name Type Inv.  Item Serial No.  Lot No. LRB No. Used Action   CEMENT BNE 20GM HALF DOSE PMMA W/ GENT M VISC RADPQ FAST - [id number]  CEMENT BNE 20GM HALF DOSE PMMA W/ GENT M VISC RADPQ FAST  [employer]WD [id number] Left 1 Implanted   COMPNT FEM CR TRIATHLN 7 L PA -- MOR-KNEE - [id number]  COMPNT FEM CR TRIATHLN 7 L PA -- MOR-KNEE  [employer] HOW_WD [id number] Left 1 Implanted   BASEPLT TIB PC TRITNM SZ 7 -- TRIATHLON - [id number]  BASEPLT TIB PC TRITNM SZ 7 -- TRIATHLON  [employer] HOW_WD [id number] Left 1 Implanted   COMPNT PAT ASYM TRIATHLN 38X11 --  - [id number]  COMPNT PAT ASYM TRIATHLN 38X11 --   [employer] HOWM_WD [id number] Left 1 Implanted   INSERT TIB SZ 7 THK9MM UNIV KNEE POLYETH CNDYL STBL SONAL NEUT - [id number]  INSERT TIB SZ 7 THK9MM UNIV KNEE POLYETH CNDYL STBL SONAL NEUT  [employer] HOW_WD [id number] Left 1 Implanted         Signed By: Florencio Morris MD   4/25/2022,  8:41 AM

## 2022-04-25 NOTE — ANESTHESIA PROCEDURE NOTES
Peripheral Block    Start time: 4/25/2022 6:53 AM  End time: 4/25/2022 6:54 AM  Performed by: Jake Hardy MD  Authorized by: Jake Hardy MD       Pre-procedure: Indications: at surgeon's request and post-op pain management    Preanesthetic Checklist: patient identified, risks and benefits discussed, site marked, timeout performed, anesthesia consent given and patient being monitored    Timeout Time: 06:52 EDT          Block Type:   Block Type:   Adductor canal  Laterality:  Left  Monitoring:  Continuous pulse ox, frequent vital sign checks, heart rate, responsive to questions and oxygen  Injection Technique:  Single shot  Procedures: ultrasound guided    Prep: chlorhexidine    Location:  Mid thigh  Needle Type:  Stimuplex  Needle Gauge:  20 G  Needle Localization:  Anatomical landmarks, infiltration and ultrasound guidance  Medication Injected:  Ropivacaine (NAROPIN) 2 mg/mL (0.2 %) injection, 40 mg  Med Admin Time: 4/25/2022 6:54 AM    Assessment:  Number of attempts:  1  Injection Assessment:  Incremental injection every 5 mL, negative aspiration for CSF, local visualized surrounding nerve on ultrasound, negative aspiration for blood, no intravascular symptoms, no paresthesia and ultrasound image on chart  Patient tolerance:  Patient tolerated the procedure well with no immediate complications

## 2022-04-25 NOTE — ANESTHESIA PROCEDURE NOTES
Spinal Block    Start time: 4/25/2022 7:12 AM  End time: 4/25/2022 7:14 AM  Performed by: Sharmaine Rodrigues MD  Authorized by: Sharmaine Rodrigues MD     Pre-procedure:   Indications: at surgeon's request and primary anesthetic  Preanesthetic Checklist: patient identified, risks and benefits discussed, anesthesia consent, site marked, patient being monitored and timeout performed    Timeout Time: 07:11 EDT          Spinal Block:   Patient Position:  Seated  Prep Region:  Lumbar  Prep: chlorhexidine      Location:  L2-3  Technique:  Single shot        Needle:   Needle Type:  Pencan  Needle Gauge:  25 G  Attempts:  1      Events: CSF confirmed, no blood with aspiration and no paresthesia        Assessment:  Insertion:  Uncomplicated  Patient tolerance:  Patient tolerated the procedure well with no immediate complications

## 2022-04-25 NOTE — INTERVAL H&P NOTE
Update History & Physical    The Patient's History and Physical of April 20,   H&P was reviewed with the patient and I examined the patient. There was no change. The surgical site was confirmed by the patient and me. Plan:  The risk, benefits, expected outcome, and alternative to the recommended procedure have been discussed with the patient. Patient understands and wants to proceed with the procedure.     Electronically signed by Nasreen Diaz MD on 4/25/2022 at 6:45 AM

## 2022-04-25 NOTE — PROGRESS NOTES
[address] [facility] Pharmacokinetic Monitoring Service - Vancomycin     Kayli Figueroa is a 81 y.o. male starting on vancomycin therapy for Surgical prophylaxis. Pharmacy consulted by Dr. Philip Dixon for monitoring and adjustment. Target Concentration: Goal AUC/CORINE 400-600 mg*hr/L    Additional Antimicrobials: Ancef    Pertinent Laboratory Values: Wt Readings from Last 1 Encounters:   04/25/22 95.8 kg (211 lb 1.6 oz)     Temp Readings from Last 1 Encounters:   04/25/22 97.9 °F (36.6 °C)     No components found for: PROCAL  No results for input(s): BUN, CREA, WBC, PCT, LAC, LCAD, LACT, LACPOC in the last 72 hours. No lab exists for component: PROCAT  Estimated Creatinine Clearance: 84.8 mL/min (by C-G formula based on SCr of 0.82 mg/dL). No results found for: Nevaeh Bradford    MRSA Nasal Swab: N/A. Non-respiratory infection. .    Plan:  Dosing recommendations based on Bayesian software  Start vancomycin 1500 mg x 1 dose, then 1000 mg q12h  Anticipated AUC of 519 and trough concentration of 17.2 at steady state  Renal labs as indicated   Vancomycin concentration ordered for 4/26 @ 0400    Pharmacy will continue to monitor patient and adjust therapy as indicated    Thank you for the consult,  WIN Jones

## 2022-04-25 NOTE — PROGRESS NOTES
Problem: Self Care Deficits Care Plan (Adult)  Goal: *Acute Goals and Plan of Care (Insert Text)  Outcome: Progressing Towards Goal  Note: GOALS:   DISCHARGE GOALS (in preparation for going home/rehab):  3 days  1. Mr. Florin Barksdale will perform one lower body dressing activity with stand by assist required to demonstrate improved functional mobility and safety. 2.  Mr. Florin Barksdale will perform one lower body bathing activity with stand by assist required to demonstrate improved functional mobility and safety. 3.  Mr. Florin Barksdale will perform toileting/toilet transfer with stand by assist to demonstrate improved functional mobility and safety. 4.  Mr. Florin Barksdale will perform shower transfer with stand by assist to demonstrate improved functional mobility and safety. [facility] OCCUPATIONAL THERAPY TKA: Initial Assessment and Daily Note 4/25/2022  OUTPATIENT: Hospital Day: 1  [place] / [place] / Product Type: Medicare /      NAME/AGE/GENDER: James Smith is a 81 y.o. male   PRIMARY DIAGNOSIS:  Primary osteoarthritis of left knee [M17.12]   Procedure(s) and Anesthesia Type:     * LEFT ELLI KNEE ARTHROPLASTY TOTAL ROBOTIC ASSISTED/ TIERRA/ ADDUCTOR CANAL BLOCK - Spinal (Left)  ICD-10: Treatment Diagnosis:    · Pain in Left Knee (M25.562)  · Stiffness of Left Knee, Not elsewhere classified (X33.931)      ASSESSMENT:     Mr. Florin Barksdale is s/p left TKA and presents with decreased weight bearing on L LE and decreased independence with functional mobility and activities of daily living as compared to baseline level of function and safety. Patient would benefit from skilled Occupational Therapy to maximize independence and safety with self-care task and functional mobility. Pt would also benefit from education on adaptive equipment and safety precautions in preparation for going home.       Patient able to don clothes at edge of bed with assist. Mobilized from bed to recliner using a rolling walker with assist. Should progress well with ADL's tomorrow. This section established at most recent assessment   PROBLEM LIST (Impairments causing functional limitations):  1. Decreased Strength  2. Decreased ADL/Functional Activities  3. Decreased Transfer Abilities  4. Increased Pain  5. Increased Fatigue  6. Decreased Flexibility/Joint Mobility  7. Decreased Knowledge of Precautions   INTERVENTIONS PLANNED: (Benefits and precautions of occupational therapy have been discussed with the patient.)  1. Activities of daily living training  2. Adaptive equipment training  3. Balance training  4. Clothing management  5. Donning&doffing training  6. Theraputic activity     TREATMENT PLAN: Frequency/Duration: Follow patient 1-2tx to address above goals. Rehabilitation Potential For Stated Goals: Good     RECOMMENDED REHABILITATION/EQUIPMENT: (at time of discharge pending progress): Continue Skilled Therapy. OCCUPATIONAL PROFILE AND HISTORY:   History of Present Injury/Illness (Reason for Referral): Pt presents this date s/p (Left) TKA. Past Medical History/Comorbidities:   Mr. Anne Mandel  has a past medical history of A-fib [facility]), Frequent urination, and Hiatal hernia. Mr. Anne Mandel  has a past surgical history that includes hx back surgery; hx heart catheterization (2008); hx heart catheterization (2018); hx myringotomy; hx implantable loop recorder (03/24/2021); hx tonsil and adenoidectomy; and hx vasectomy. Social History/Living Environment:   Home Environment: Private residence  # Steps to Enter: 1  Rails to Enter: No  One/Two Story Residence: Two story  # of Interior Steps: 15  Interior Rails: Right  Living Alone: No  Support Systems: Spouse/Significant Other  Patient Expects to be Discharged to[de-identified] Home with home health  Current DME Used/Available at Home: Cane, straight  Tub or Shower Type: Tub/Shower combination    Prior Level of Function/Work/Activity:  Independent prior. Number of Personal Factors/Comorbidities that affect the Plan of Care: Brief history (0):  LOW COMPLEXITY   ASSESSMENT OF OCCUPATIONAL PERFORMANCE[de-identified]   Most Recent Physical Functioning:   Balance  Sitting: Intact  Standing: With support                    Coordination  Fine Motor Skills-Upper: Left Intact; Right Intact  Gross Motor Skills-Upper: Left Intact; Right Intact         Mental Status  Neurologic State: Alert  Orientation Level: Oriented X4  Cognition: Appropriate decision making  Perception: Appears intact                Basic ADLs (From Assessment) Complex ADLs (From Assessment)   Basic ADL  Feeding: Independent  Oral Facial Hygiene/Grooming: Supervision  Bathing: Minimum assistance  Upper Body Dressing: Setup  Lower Body Dressing: Moderate assistance  Toileting: Minimum assistance     Grooming/Bathing/Dressing Activities of Daily Living                       Functional Transfers  Bathroom Mobility: Minimum assistance  Toilet Transfer : Supervision  Shower Transfer: Minimum assistance     Bed/Mat Mobility  Supine to Sit: Contact guard assistance  Sit to Stand: Contact guard assistance  Stand to Sit: Contact guard assistance  Bed to Chair: Contact guard assistance  Scooting: Contact guard assistance         Physical Skills Involved:  1. Range of Motion  2. Balance  3. Strength  4. Activity Tolerance Cognitive Skills Affected (resulting in the inability to perform in a timely and safe manner):  1. [facility] Psychosocial Skills Affected:  1. WFL   Number of elements that affect the Plan of Care: 1-3:  LOW COMPLEXITY   CLINICAL DECISION MAKIN [facility] [address] [address] AM-PAC 6 Clicks   Daily Activity Inpatient Short Form  How much help from another person does the patient currently need. .. Total A Lot A Little None   1. Putting on and taking off regular lower body clothing? [] 1   [x] 2   [] 3   [] 4   2. Bathing (including washing, rinsing, drying)? [] 1   [x] 2   [] 3   [] 4   3.   Toileting, which includes using toilet, bedpan or urinal?   [] 1   [x] 2   [] 3   [] 4   4. Putting on and taking off regular upper body clothing? [] 1   [] 2   [] 3   [x] 4   5. Taking care of personal grooming such as brushing teeth? [] 1   [] 2   [] 3   [x] 4   6. Eating meals? [] 1   [] 2   [] 3   [x] 4   © 2007, Trustees of [address] [place] [address] [address] [address], under license to [employer]. All rights reserved     Score:  Initial: 18 Most Recent: X (Date: -- )    Interpretation of Tool:  Represents activities that are increasingly more difficult (i.e. Bed mobility, Transfers, Gait). Medical Necessity:     · Skilled intervention continues to be required due to Deficits noted above. Reason for Services/Other Comments:  · Patient continues to require skilled intervention due to   · New TKA  · . Use of outcome tool(s) and clinical judgement create a POC that gives a: MODERATE COMPLEXITY            TREATMENT:   (In addition to Assessment/Re-Assessment sessions the following treatments were rendered)     Pre-treatment Symptoms/Complaints:    Pain: Initial:   Pain Intensity 1: 2  Post Session:  2     Self Care: (28): Procedure(s) (per grid) utilized to improve and/or restore self-care/home management as related to dressing, toileting, and grooming. Required minimal verbal and tactile cueing to facilitate activities of daily living skills. Initial evaluation 5 mintues. Treatment/Session Assessment:     Response to Treatment:  Good, sitting up in recliner.     Education:  [] Home Exercises  [x] Fall Precautions  [] Hip Precautions [] Going Home Video  [x] Knee/Hip Prosthesis Review  [x] Walker Management/Safety [x] Adaptive Equipment as Needed       Interdisciplinary Collaboration:   o Physical Therapist  o Occupational Therapist  o Registered Nurse    After treatment position/precautions:   o Up in chair  o Bed/Chair-wheels locked  o Caregiver at bedside  o Call light within reach  o RN notified     Compliance with Program/Exercises: Compliant all of the time, Will assess as treatment progresses. Recommendations/Intent for next treatment session:  Treatment next visit will focus on increasing Mr. Riaz Carrillo independence with bed mobility, transfers, self care, functional mobility, modalities for pain, and patient education.       Total Treatment Duration:  OT Patient Time In/Time Out  Time In: 1302  Time Out: 333 César Borjas, OT

## 2022-04-25 NOTE — PROGRESS NOTES
04/25/22 1458   Oxygen Therapy   O2 Sat (%) 92 %   Pulse via Oximetry 91 beats per minute   O2 Device None (Room air)   O2 Flow Rate (L/min) 0 l/min   Incentive Spirometry Treatment   Actual Volume (ml) 2250 ml   Patient encouraged to do 10 breaths every hour while awake-patient agreed and demonstrated. No shortness of breath or distress noted. BS are clear b/l. [facility] notes reviewed- Sat monitor order noted Hs.

## 2022-04-25 NOTE — PROGRESS NOTES
Care Management Interventions  PCP Verified by CM: Yes  Mode of Transport at Discharge: Self  Transition of Care Consult (CM Consult): [address]: Yes  Discharge Durable Medical Equipment: Yes  Physical Therapy Consult: Yes  Occupational Therapy Consult: Yes  Support Systems: Spouse/Significant Other  Confirm Follow Up Transport: Self  The Plan for Transition of Care is Related to the Following Treatment Goals : improve mobility  The Patient and/or Patient Representative was Provided with a Choice of Provider and Agrees with the Discharge Plan?: Yes  Name of the Patient Representative Who was Provided with a Choice of Provider and Agrees with the Discharge Plan: pt  Freedom of Choice List was Provided with Basic Dialogue that Supports the Patient's Individualized Plan of Care/Goals, Treatment Preferences and Shares the Quality Data Associated with the Providers?: Yes  Discharge Location  Patient Expects to be Discharged to[de-identified] Home with home health  Patient is a 81y.o. year old male admitted for Left TKA . Patient plans to return home on discharge. Order received to arrange home health. Patient requesting [facility]. Referral called and faxed to [facility]. Patient requesting we arrange a walker. Referral sent to [address] [place] [address]. delivered to the hospital room prior to discharge. Will follow until discharge.

## 2022-04-25 NOTE — CONSULTS
[facility] Hospitalist Consult   Admit Date:  2022  5:32 AM   Name:  Derrick Rossi   Age:  81 y.o. Sex:  male  :  1940   MRN:  [id number]   Room:  [facility]    Presenting Complaint: left knee pain    Reason(s) for Admission: Primary osteoarthritis of left knee [M17.12]  Osteoarthritis of left knee [M17.12]     Hospitalists consulted by Samira Keane MD for: medical management. History of Presenting Illness:   Derrick Rossi is a 81 y.o. male with history of A Fib, BPH, hiatal hernia who was admitted for left knee arthroplasty. Hospitalist asked to consult for medical management. A&O x3, denies pain or discomfort. Review of Systems:  10 systems reviewed and negative except as noted in HPI. Assessment & Plan:     Principal Problem:    Status post left knee replacement (2022)    Per ortho. Active Problems:    Osteoarthritis of left knee (2022)    Per ortho. Atrial fibrillation ([address] (2022)    Resumed Lopressor 25 mg bid home dose  Eliquis resumed per ortho.       BPH (benign prostatic hyperplasia) (2022)    Flomax resumed      Hiatal hernia (2022)    noted        Discharge Plannin-2 days    Diet:  ADULT DIET Regular  DVT PPx: Eliquis home dose  Code status: Full Code    Hospital Problems as of 2022 Date Reviewed: 2022          Codes Class Noted - Resolved POA    Osteoarthritis of left knee ICD-10-CM: M17.12  ICD-9-CM: 715.96  2022 - Present Unknown        * (Principal) Status post left knee replacement ICD-10-CM: M22.866  ICD-9-CM: V43.65  2022 - Present Unknown        Atrial fibrillation ([address] ICD-10-CM: I48.91  ICD-9-CM: 427.31  2022 - Present Unknown        BPH (benign prostatic hyperplasia) ICD-10-CM: N40.0  ICD-9-CM: 600.00  2022 - Present Unknown        Hiatal hernia ICD-10-CM: K44.9  ICD-9-CM: 553.3  2022 - Present Unknown              Past History:  Past Medical History:   Diagnosis Date    A-fib [facility])     cardioversion    Frequent urination     taking flomax    Hiatal hernia       Past Surgical History:   Procedure Laterality Date    HX BACK SURGERY      lumbar fusion    HX HEART CATHETERIZATION  2008    HX HEART CATHETERIZATION  2018    left heart cath femoral approach, right heart cath    HX IMPLANTABLE LOOP RECORDER  03/24/2021    Dr. Pily Higuera, [facility] HX MYRINGOTOMY      w/ tubes    HX TONSIL AND ADENOIDECTOMY      HX VASECTOMY        Allergies   Allergen Reactions    Finasteride Unknown (comments)      Social History     Tobacco Use    Smoking status: Never Smoker    Smokeless tobacco: Never Used   Substance Use Topics    Alcohol use: Yes     Alcohol/week: 1.0 - 2.0 standard drink     Types: 1 - 2 Glasses of wine per week      No family history on file. Family history reviewed and negative except as otherwise noted.     Immunization History   Administered Date(s) Administered    COVID-19, Pfizer Purple top, DILUTE for use, 12+ yrs, 30mcg/0.3mL dose 04/01/2021, 04/22/2021     Current Facility-Administered Medications   Medication Dose Route Frequency    [START ON 4/26/2022] apixaban (ELIQUIS) tablet 5 mg  5 mg Oral Q12H    fluticasone propionate (FLONASE) 50 mcg/actuation nasal spray 2 Spray  2 Spray Both Nostrils DAILY    tamsulosin (FLOMAX) capsule 0.4 mg  0.4 mg Oral QHS    alcohol 62% (NOZIN) nasal  1 Ampule  1 Ampule Topical Q12H    0.9% sodium chloride infusion  100 mL/hr IntraVENous CONTINUOUS    sodium chloride (NS) flush 5-40 mL  5-40 mL IntraVENous Q8H    sodium chloride (NS) flush 5-40 mL  5-40 mL IntraVENous PRN    ceFAZolin (ANCEF) 2 g/20 mL in sterile water IV syringe  2 g IntraVENous Q8H    acetaminophen (TYLENOL) tablet 1,000 mg  1,000 mg Oral Q6H    oxyCODONE IR (ROXICODONE) tablet 10 mg  10 mg Oral Q4H PRN    HYDROmorphone (DILAUDID) injection 1 mg  1 mg IntraVENous Q3H PRN    naloxone (NARCAN) injection 0.2-0.4 mg  0.2-0.4 mg IntraVENous Q10MIN PRN    [START ON 4/26/2022] dexamethasone (DECADRON) 10 mg/mL injection 10 mg  10 mg IntraVENous ONCE    ondansetron (ZOFRAN ODT) tablet 4 mg  4 mg Oral Q4H PRN    diphenhydrAMINE (BENADRYL) capsule 25 mg  25 mg Oral Q4H PRN    [START ON 4/26/2022] senna-docusate (PERICOLACE) 8.6-50 mg per tablet 2 Tablet  2 Tablet Oral DAILY    aspirin delayed-release tablet 81 mg  81 mg Oral Q12H    amiodarone (CORDARONE) tablet 200 mg  200 mg Oral DAILY       Objective:     Patient Vitals for the past 24 hrs:   Temp Pulse Resp BP SpO2   04/25/22 0955 -- 99 12 125/63 95 %   04/25/22 0950 -- 94 13 122/64 95 %   04/25/22 0945 -- 92 18 113/61 96 %   04/25/22 0940 -- 82 12 113/61 95 %   04/25/22 0935 -- (!) 101 14 114/66 97 %   04/25/22 0930 -- 89 14 (!) 110/59 96 %   04/25/22 0925 -- 92 16 101/61 96 %   04/25/22 0922 97.9 °F (36.6 °C) 92 20 102/67 97 %   04/25/22 0659 -- 78 16 (!) 155/83 100 %   04/25/22 0654 -- 75 16 (!) 151/70 100 %   04/25/22 0650 -- 74 16 (!) 150/74 100 %   04/25/22 0638 -- 70 16 (!) 146/72 100 %   04/25/22 0550 97 °F (36.1 °C) 69 16 (!) 152/84 100 %     Oxygen Therapy  O2 Sat (%): 95 % (04/25/22 1027)  Pulse via Oximetry: 84 beats per minute (04/25/22 0955)  O2 Device: Nasal cannula (04/25/22 0945)  O2 Flow Rate (L/min): 2 l/min (04/25/22 0955)    Estimated body mass index is 28.63 kg/m² as calculated from the following:    Height as of this encounter: 6' (1.829 m). Weight as of this encounter: 95.8 kg (211 lb 1.6 oz). Intake/Output Summary (Last 24 hours) at 4/25/2022 1026  Last data filed at 4/25/2022 0838  Gross per 24 hour   Intake 2300 ml   Output 250 ml   Net 2050 ml         Physical Exam:  Blood pressure (!) 144/91, pulse (!) 59, temperature 97.9 °F (36.6 °C), resp. rate 15, height 6' (1.829 m), weight 95.8 kg (211 lb 1.6 oz), SpO2 95 %. General:    Well nourished. No overt distress  Head:  Normocephalic, atraumatic  Eyes:  Sclerae appear normal.  Pupils equally round.   ENT:  Nares appear normal, no drainage. Moist oral mucosa  Neck:  No restricted ROM. Trachea midline   CV:   Irregular rate/rhythm, controlled rate. No m/r/g. No jugular venous distension. Lungs:   CTAB. No wheezing, rhonchi, or rales. Respirations even, unlabored  Abdomen: Bowel sounds present. Soft, nontender, nondistended. Extremities: No cyanosis or clubbing. No edema, left knee immobilizer intact. Skin:     No rashes and normal coloration. Warm and dry. Neuro:  CN II-XII grossly intact. Sensation intact. A&Ox3  Psych:  Normal mood and affect. I have reviewed ordered lab tests and independently visualized imaging below:    Recent Labs:  No results found for this or any previous visit (from the past 48 hour(s)). All Micro Results     None          Other Studies:  No results found.     Signed:  Regina Adams NP

## 2022-04-25 NOTE — ANESTHESIA POSTPROCEDURE EVALUATION
Procedure(s):  LEFT ELLI KNEE ARTHROPLASTY TOTAL ROBOTIC ASSISTED/ TIERRA/ ADDUCTOR CANAL BLOCK.    spinal    Anesthesia Post Evaluation      Multimodal analgesia: multimodal analgesia used between 6 hours prior to anesthesia start to PACU discharge  Patient location during evaluation: PACU  Patient participation: complete - patient participated  Level of consciousness: awake and alert  Pain management: adequate  Airway patency: patent  Anesthetic complications: no  Cardiovascular status: acceptable and hemodynamically stable  Respiratory status: acceptable  Hydration status: acceptable  Post anesthesia nausea and vomiting:  controlled  Final Post Anesthesia Temperature Assessment:  Normothermia (36.0-37.5 degrees C)      INITIAL Post-op Vital signs:   Vitals Value Taken Time   /64 04/25/22 0952   Temp 36.6 °C (97.9 °F) 04/25/22 0922   Pulse 79 04/25/22 0955   Resp 12 04/25/22 0955   SpO2 95 % 04/25/22 0955   Vitals shown include unvalidated device data.

## 2022-04-25 NOTE — PERIOP NOTES
TRANSFER - OUT REPORT:    Verbal report given to Rikki Branham RN on Prince Abdul  being transferred to Room 334 for routine progression of care       Report consisted of patients Situation, Background, Assessment and   Recommendations(SBAR). Information from the following report(s) SBAR, Procedure Summary, Intake/Output, MAR, Recent Results and Cardiac Rhythm Afib was reviewed with the receiving nurse. Lines:   Peripheral IV 04/25/22 Left;Posterior Wrist (Active)   Site Assessment Clean, dry, & intact 04/25/22 0922   Phlebitis Assessment 0 04/25/22 0922   Infiltration Assessment 0 04/25/22 0922   Dressing Status Clean, dry, & intact 04/25/22 0922   Dressing Type Tape;Transparent 04/25/22 1461   Hub Color/Line Status Green; Infusing;Patent 04/25/22 7281        Opportunity for questions and clarification was provided.       Patient transported with:   O2 @ 2 liters

## 2022-04-25 NOTE — PROGRESS NOTES
Problem: Mobility Impaired (Adult and Pediatric)  Goal: *Acute Goals and Plan of Care (Insert Text)  Outcome: Progressing Towards Goal  Note: GOALS (1-4 days):  (1.)Mr. Jackie Brooks will move from supine to sit and sit to supine  in bed with SUPERVISION. (2.)Mr. Jackie Brooks will transfer from bed to chair and chair to bed with STAND BY ASSIST using the least restrictive device. (3.)Mr. Jackie Brooks will ambulate with STAND BY ASSIST for 300 feet with the least restrictive device. (4.)Mr. Jackie Brooks will ambulate up/down 1 steps with no railing with CONTACT GUARD ASSIST with walker, also pt able to go up & down 14 steps with right rail, device PRN & CGA  (5.)Mr. Jackie Brooks will increase left knee ROM to 0°-90°.  ________________________________________________________________________________________________      PHYSICAL THERAPY JOINT CAMP TKA: Initial Assessment, Treatment Day: Day of Assessment, and PM 4/25/2022  OUTPATIENT: Hospital Day: 1  [place] / [place] / Product Type: Medicare /      NAME/AGE/GENDER: Ammon Felipe is a 81 y.o. male   PRIMARY DIAGNOSIS:  Primary osteoarthritis of left knee [M17.12]   Procedure(s) and Anesthesia Type:     * LEFT ELLI KNEE ARTHROPLASTY TOTAL ROBOTIC ASSISTED/ TIERRA/ ADDUCTOR CANAL BLOCK - Spinal (Left)  ICD-10: Treatment Diagnosis:    · Pain in Left Knee (M25.562)  · Stiffness of Left Knee, Not elsewhere classified (M25.662)  · Difficulty in walking, Not elsewhere classified (R26.2)      ASSESSMENT:     Mr. Jackie Brooks presents with impaired strength & mobility s/p left TKA. Pt also had decreased stability during out of bed activity. This pt will benefit from follow up therapy to help restore safe function prior to returning home with caregiver. This section established at most recent assessment   PROBLEM LIST (Impairments causing functional limitations):  1. Decreased Strength  2. Decreased ADL/Functional Activities  3.  Decreased Transfer Abilities  4. Decreased Ambulation Ability/Technique  5. Decreased Balance  6. Increased Pain  7. Decreased Activity Tolerance  8. Decreased Flexibility/Joint Mobility  9. Decreased [place] with Home Exercise Program   INTERVENTIONS PLANNED: (Benefits and precautions of physical therapy have been discussed with the patient.)  1. Bed Mobility  2. Cold  3. Gait Training  4. Home Exercise Program (HEP)  5. Range of Motion (ROM)  6. Therapeutic Activites  7. Therapeutic Exercise/Strengthening  8. Transfer Training     TREATMENT PLAN: Frequency/Duration: Follow patient BID for duration of hospital stay to address above goals. Rehabilitation Potential For Stated Goals: Good     RECOMMENDED REHABILITATION/EQUIPMENT: (at time of discharge pending progress): Continue Skilled Therapy and Home Health: Physical Therapy. HISTORY:   History of Present Injury/Illness (Reason for Referral):  Left TKA  Past Medical History/Comorbidities:   Mr. Bess Galicia  has a past medical history of A-fib ([address] Frequent urination, and Hiatal hernia. Mr. Bess Galicia  has a past surgical history that includes hx back surgery; hx heart catheterization (2008); hx heart catheterization (2018); hx myringotomy; hx implantable loop recorder (03/24/2021); hx tonsil and adenoidectomy; and hx vasectomy.   Social History/Living Environment:   Home Environment: Private residence  # Steps to Enter: 1  Rails to Enter: No  One/Two Story Residence: Two story  # of Interior Steps: 15  Interior Rails: Right  Living Alone: No  Support Systems: Spouse/Significant Other  Patient Expects to be Discharged to[de-identified] Home with home health  Current DME Used/Available at Home: None  Tub or Shower Type: Tub/Shower combination    Prior Level of Function/Work/Activity:  Pt was independent without an assistive device   Number of Personal Factors/Comorbidities that affect the Plan of Care: 3+: HIGH COMPLEXITY   EXAMINATION:   Most Recent Physical Functioning:   Gross Assessment: Yes  Gross Assessment  AROM: Within functional limits (right LE)  Strength: Within functional limits (right LE)  Coordination: Within functional limits (right LE)          LLE AROM  L Knee Flexion: 90 (~post op)  L Knee Extension: -10 (~post op)          Bed Mobility  Supine to Sit: Contact guard assistance  Sit to Supine: Contact guard assistance  Scooting: Contact guard assistance    Transfers  Sit to Stand: Contact guard assistance  Stand to Sit: Contact guard assistance  Bed to Chair: Contact guard assistance (with walker)    Balance  Sitting: Intact; Without support  Standing: Impaired; With support (walker)              Weight Bearing Status  Left Side Weight Bearing: As tolerated  Distance (ft):  (additional 170ft after an initial 50ft gait assessment)  Ambulation - Level of Assistance: Contact guard assistance  Assistive Device: Walker, rolling  Speed/Suri: Delayed  Step Length: Right shortened  Stance: Left decreased  Gait Abnormalities: Antalgic;Decreased step clearance  Number of Stairs Trained: 6  Stairs - Level of Assistance: Contact guard assistance  Rail Use: Right  (cane in left hand, 1 step with walker & CGA)     Braces/Orthotics: none    Left Knee Cold  Type: Cryocuff      Body Structures Involved:  1. Joints  2. Muscles Body Functions Affected:  1. Sensory/Pain  2. Movement Related Activities and Participation Affected:  1. General Tasks and Demands  2. Mobility   Number of elements that affect the Plan of Care: 4+: HIGH COMPLEXITY   CLINICAL PRESENTATION:   Presentation: Stable and uncomplicated: LOW COMPLEXITY   CLINICAL DECISION MAKING:   Perta Torres -PAC 6 Clicks   Basic Mobility Inpatient Short Form  How much difficulty does the patient currently have. .. Unable A Lot A Little None   1. Turning over in bed (including adjusting bedclothes, sheets and blankets)? [] 1   [] 2   [x] 3   [] 4   2.   Sitting down on and standing up from a chair with arms ( e.g., wheelchair, bedside commode, etc.)   [] 1   [] 2   [x] 3   [] 4   3. Moving from lying on back to sitting on the side of the bed? [] 1   [] 2   [x] 3   [] 4   How much help from another person does the patient currently need. .. Total A Lot A Little None   4. Moving to and from a bed to a chair (including a wheelchair)? [] 1   [] 2   [x] 3   [] 4   5. Need to walk in hospital room? [] 1   [] 2   [x] 3   [] 4   6. Climbing 3-5 steps with a railing? [] 1   [] 2   [x] 3   [] 4   © 2007, Trustees of [address] [place] [address] [address] [id number], under license to [employer]. All rights reserved     Score:  Initial: 16 Most Recent: X (Date: -- )    Interpretation of Tool:  Represents activities that are increasingly more difficult (i.e. Bed mobility, Transfers, Gait). Medical Necessity:     · Patient is expected to demonstrate progress in   · strength, range of motion, balance, coordination, and functional technique  ·  to   · decrease assistance required with bed mobility, transfers & gait  · .  Reason for Services/Other Comments:  · Patient continues to require skilled intervention due to   · Pt not independent with functional mobility  · . Use of outcome tool(s) and clinical judgement create a POC that gives a: Clear prediction of patient's progress: LOW COMPLEXITY            TREATMENT:   (In addition to Assessment/Re-Assessment sessions the following treatments were rendered)     Pre-treatment Symptoms/Complaints:  pt agreeable  Pain Initial: numeric scale  Pain Intensity 1: 3  Pain Location 1: Knee  Pain Orientation 1: Left  Pain Intervention(s) 1: Ambulation/Increased Activity,Cold pack  Post Session:  3/10     Therapeutic Activity: (    38 min (extra time to work through activity noted):   Therapeutic activities including TKA exercises, bathroom transfer, review of PT expectations, progressive gait training an additional 170 ft after an initial 50 ft gait assessment, reviewed single step & multiple step ambulation to improve mobility, strength, balance, coordination, and dynamic movement of leg - left to improve functional endurance & stability . Assessment     Date:  4/25 Date:   Date:     ACTIVITY/EXERCISE AM PM AM PM AM PM   GROUP THERAPY  []  []  []  []  []  []   Ankle Pumps  20       Quad Sets  20       Gluteal Sets  20       Hip ABd/ADduction  20       Straight Leg Raises  20       Knee Slides  20       Short Arc Quads  20       Long Arc Quads         Chair Slides  20                B = bilateral; AA = active assistive; A = active; P = passive      Treatment/Session Assessment:     Response to Treatment:  tolerated well    Education:  [x] Home Exercises  [x] Fall Precautions  [x] Use of Cold Therapy Unit [] D/C Instruction Review  [] Knee Prosthesis Review  [x] Walker Management/Safety [] Adaptive Equipment as Needed       Interdisciplinary Collaboration:   o Registered Nurse    After treatment position/precautions:   o Up in chair  o Bed/Chair-wheels locked  o Call light within reach  o RN notified    Compliance with Program/Exercises: Will assess as treatment progresses. Recommendations/Intent for next treatment session:  Treatment next visit will focus on increasing Mr. Kiran Garcia independence with bed mobility, transfers, gait training, strength/ROM exercises, modalities for pain, and patient education.       Total Treatment Duration:  PT Patient Time In/Time Out  Time In: 1400  [facility]: [facility]

## 2022-04-25 NOTE — PROGRESS NOTES
TRANSFER - IN REPORT:    [facility]  being received from PACU for routine post - op      Report consisted of patients Situation, Background, Assessment and   Recommendations(SBAR). Information from the following report(s) SBAR, Kardex, OR Summary, Procedure Summary, Intake/Output, MAR, Recent Results and Cardiac Rhythm Afib was reviewed with the receiving nurse. Opportunity for questions and clarification was provided. Assessment completed upon patients arrival to unit and care assumed.

## 2022-04-26 VITALS
TEMPERATURE: 98.4 F | WEIGHT: 211.1 LBS | SYSTOLIC BLOOD PRESSURE: 140 MMHG | DIASTOLIC BLOOD PRESSURE: 84 MMHG | RESPIRATION RATE: 20 BRPM | HEART RATE: 87 BPM | OXYGEN SATURATION: 98 % | BODY MASS INDEX: 28.59 KG/M2 | HEIGHT: 72 IN

## 2022-04-26 LAB
CREAT SERPL-MCNC: 0.94 MG/DL (ref 0.8–1.5)
HGB BLD-MCNC: 11.4 G/DL (ref 13.6–17.2)
VANCOMYCIN SERPL-MCNC: 11.3 UG/ML

## 2022-04-26 PROCEDURE — 36415 COLL VENOUS BLD VENIPUNCTURE: CPT

## 2022-04-26 PROCEDURE — 85018 HEMOGLOBIN: CPT

## 2022-04-26 PROCEDURE — 99024 POSTOP FOLLOW-UP VISIT: CPT | Performed by: ORTHOPAEDIC SURGERY

## 2022-04-26 PROCEDURE — 74011250637 HC RX REV CODE- 250/637: Performed by: PHYSICIAN ASSISTANT

## 2022-04-26 PROCEDURE — 82565 ASSAY OF CREATININE: CPT

## 2022-04-26 PROCEDURE — 74011250636 HC RX REV CODE- 250/636: Performed by: ORTHOPAEDIC SURGERY

## 2022-04-26 PROCEDURE — 97535 SELF CARE MNGMENT TRAINING: CPT

## 2022-04-26 PROCEDURE — 80202 ASSAY OF VANCOMYCIN: CPT

## 2022-04-26 PROCEDURE — 97530 THERAPEUTIC ACTIVITIES: CPT

## 2022-04-26 PROCEDURE — 74011250637 HC RX REV CODE- 250/637: Performed by: NURSE PRACTITIONER

## 2022-04-26 RX ORDER — PROMETHAZINE HYDROCHLORIDE 12.5 MG/1
12.5 TABLET ORAL
Qty: 30 TABLET | Refills: 0 | Status: SHIPPED | OUTPATIENT
Start: 2022-04-26

## 2022-04-26 RX ORDER — METHOCARBAMOL 750 MG/1
750 TABLET, FILM COATED ORAL
Qty: 42 TABLET | Refills: 1 | Status: SHIPPED | OUTPATIENT
Start: 2022-04-26

## 2022-04-26 RX ORDER — OXYCODONE HYDROCHLORIDE 5 MG/1
5-10 TABLET ORAL
Qty: 60 TABLET | Refills: 0 | Status: SHIPPED | OUTPATIENT
Start: 2022-04-26 | End: 2022-05-02 | Stop reason: SDUPTHER

## 2022-04-26 RX ORDER — POLYETHYLENE GLYCOL 3350 17 G/17G
17 POWDER, FOR SOLUTION ORAL DAILY
Status: DISCONTINUED | OUTPATIENT
Start: 2022-04-26 | End: 2022-04-26 | Stop reason: HOSPADM

## 2022-04-26 RX ADMIN — DOCUSATE SODIUM 50MG AND SENNOSIDES 8.6MG 2 TABLET: 8.6; 5 TABLET, FILM COATED ORAL at 10:20

## 2022-04-26 RX ADMIN — ACETAMINOPHEN 1000 MG: 500 TABLET, FILM COATED ORAL at 11:37

## 2022-04-26 RX ADMIN — APIXABAN 5 MG: 5 TABLET, FILM COATED ORAL at 10:20

## 2022-04-26 RX ADMIN — OXYCODONE HYDROCHLORIDE 10 MG: 5 TABLET ORAL at 11:37

## 2022-04-26 RX ADMIN — Medication 1 AMPULE: at 10:21

## 2022-04-26 RX ADMIN — VANCOMYCIN HYDROCHLORIDE 1000 MG: 1 INJECTION, POWDER, LYOPHILIZED, FOR SOLUTION INTRAVENOUS at 07:05

## 2022-04-26 RX ADMIN — OXYCODONE HYDROCHLORIDE 10 MG: 5 TABLET ORAL at 07:04

## 2022-04-26 RX ADMIN — ACETAMINOPHEN 1000 MG: 500 TABLET, FILM COATED ORAL at 07:04

## 2022-04-26 RX ADMIN — METOPROLOL TARTRATE 25 MG: 25 TABLET, FILM COATED ORAL at 10:20

## 2022-04-26 RX ADMIN — FLUTICASONE PROPIONATE 2 SPRAY: 50 SPRAY, METERED NASAL at 10:20

## 2022-04-26 NOTE — DISCHARGE INSTRUCTIONS
[facility]   Patient Discharge Instructions    Veena Mayes / [id number] : 1940    Admitted 2022 Discharged: 2022     IF YOU HAVE ANY PROBLEMS ONCE YOU ARE AT HOME CALL THE FOLLOWING NUMBERS:   Main office number: [phone]      Medications    · The medications you are to continue on are listed on the medication reconciliation sheet. · Narcotic pain medications as well as supplemental iron can cause constipation. If this occurs try stopping the narcotic pain medication and/or the iron. · It is important that you take the medication exactly as they are prescribed. · Medications which increase your risk of blood clots are listed to stop for 5 weeks after surgery as well as medications or supplements which increase your risk of bleeding complications. · Keep your medication in the bottles provided by the pharmacist and keep a list of the medication names, dosages, and times to be taken in your wallet. · Do not take other medications without consulting your doctor. Important Information    Do NOT smoke as this will greatly increase your risk of infection! Resume your prehospital diet. If you have excessive nausea or vomitting call your doctor's office     Leg swelling and warmth is normal for 6 months after surgery. If you experience swelling in your leg elevate you leg while laying down with your toes above your heart. If you have sudden onset severe swelling with leg pain call our office. The stitches deep inside take approximately 6 months to dissolve. There will be sharp shooting, stinging and burning pain. This is normal and will resolve between 3-6 months after surgery. Difficulty sleeping is normal following total Knee and Hip replacement. You may try melatonin, an over-the-counter sleep aid or benadryl to help with sleep. Most patients will resume sleeping through the night 8 weeks after surgery. Home Physical Therapy is arranged.  Home [facility] will contact you within 48 hrs of discharge that you have chosen. If you have not received a call within this time frame please contact that provider you chose. You should be given this information before you leave the hospital.     You are at a risk for falls. Use the rolling walker when walking. Patients who have had a joint replacement should not drive if they are still taking narcotic pain mediation during the daytime hours. Most patients wean themselves off of pain medication within 2-5 weeks after surgery. When to Call the office    - If you have a temperature greater then 101  - Uncontrolled vomiting   - Loose control of your bladder or bowel function  - Are unable to bear any weight   - Need a pain medication refill     Patient Education        Total Knee Replacement: What to Expect at  [address] had a total knee replacement. The doctor replaced the worn ends of the bones that connect to your knee (thighbone and lower leg bone) with plastic and metal parts. When you leave the hospital, you should be able to move around with a walker or crutches. But you will need someone to help you at home until you have more energy and can move around better. You will go home with a bandage and stitches, staples, skin glue, or tape strips. Change the bandage as your doctor tells you to. If you have stitches or staples, your doctor will remove them about 2 weeks after your surgery. Glue or tape strips will fall off on their own over time. You may still have some mild pain, and the area may be swollen for a few months after surgery. Your knee will continue to improve for up to a year. You will probably use a walker for some time after surgery. When you are ready, you can use a cane. You may be able to walk without support after a couple weeks, or when you are comfortable. You will need to do months of physical rehabilitation (rehab) after a knee replacement.  Rehab will help you strengthen the muscles of the knee and help you regain movement. After you recover, your artificial knee will allow you to do normal daily activities with less pain or no pain at all. You may be able to hike, dance, or ride a bike. Talk to your doctor about whether you can do more strenuous activities. Always tell your caregivers that you have an artificial knee. How long it will take to walk on your own, return to normal activities, and go back to work depends on your health and how well your rehabilitation (rehab) program goes. The better you do with your rehab exercises, the quicker you will get your strength and movement back. This care sheet gives you a general idea about how long it will take for you to recover. But each person recovers at a different pace. Follow the steps below to get better as quickly as possible. How can you care for yourself at home? Activity    · Rest when you feel tired. You may take a nap, but don't stay in bed all day. When you sit, use a chair with arms. You can use the arms to help you stand up.     · Work with your physical therapist to find the best way to exercise. What you can do as your knee heals will depend on whether your new knee is cemented or uncemented. You may not be able to do certain things for a while if your new knee is uncemented.     · After your knee has healed enough, you can do more strenuous activities with caution. ? You can golf, but you may want to use a golf cart for some time. And don't wear shoes with spikes. ? You can bike on a flat road or on a stationary bike. Talk to your doctor before biking uphill. ? Your doctor may suggest that you stay away from activities that put stress on your knee. These include tennis, badminton, contact sports like football, jumping (such as in basketball), jogging, and running. ? Avoid activities where you might fall.     · Do not sit for more than 1 hour at a time.  Get up and walk around for a while before you sit again. If you must sit for a long time, prop up your leg with a chair or footstool. This will help you avoid swelling.     · Ask your doctor when you can drive again. It may take several weeks after knee replacement surgery before it's safe for you to drive.     · When you get into a car, sit on the edge of the seat. Then pull in your legs, and turn to face the front.     · You should be able to do many everyday activities 3 to 6 weeks after your surgery. You will probably need to take 4 to 16 weeks off from work. When you can go back to work depends on the type of work you do and how you feel.     · Ask your doctor when it is okay for you to have sex.     · For 12 weeks, do not lift anything heavier than 10 pounds and do not lift weights. Diet    · By the time you leave the hospital, you should be eating your normal diet. If your stomach is upset, try bland, low-fat foods like plain rice, broiled chicken, toast, and yogurt. Your doctor may suggest that you take iron and vitamin supplements.     · Drink plenty of fluids (unless your doctor tells you not to).   · Eat healthy foods, and watch your portion sizes. Try to stay at your ideal weight. Too much weight puts more stress on your new knee.     · You may notice that your bowel movements are not regular right after your surgery. This is common. Try to avoid constipation and straining with bowel movements. Drinking enough fluids, taking a stool softener, and eating foods that are good sources of fiber can help you avoid constipation. If you have not had a bowel movement after a couple of days, talk to your doctor. Medicines    · Your doctor will tell you if and when you can restart your medicines. You will also get instructions about taking any new medicines.     · If you take aspirin or some other blood thinner, ask your doctor if and when to start taking it again.  Make sure that you understand exactly what your doctor wants you to do.     · Your doctor may give you a blood-thinning medicine to prevent blood clots. If you take a blood thinner, be sure you get instructions about how to take your medicine safely. Blood thinners can cause serious bleeding problems. This medicine could be in pill form or as a shot (injection). If a shot is needed, your doctor will tell you how to do this.     · Be safe with medicines. Take pain medicines exactly as directed. ? If the doctor gave you a prescription medicine for pain, take it as prescribed. ? If you are not taking a prescription pain medicine, ask your doctor if you can take an over-the-counter medicine. ? Plan to take your pain medicine 30 minutes before exercises. It is easier to prevent pain before it starts than to stop it after it has started.     · If you think your pain medicine is making you sick to your stomach:  ? Take your medicine after meals (unless your doctor has told you not to). ? Ask your doctor for a different pain medicine.     · If your doctor prescribed antibiotics, take them as directed. Do not stop taking them just because you feel better. You need to take the full course of antibiotics. Incision care    · If your doctor told you how to care for your cut (incision), follow your doctor's instructions. You will have a dressing over the cut. A dressing helps the incision heal and protects it. Your doctor will tell you how to take care of this.     · If you did not get instructions, follow this general advice:  ? If you have strips of tape on the cut the doctor made, leave the tape on for a week or until it falls off.  ? If you have stitches or staples, your doctor will tell you when to come back to have them removed. ? If you have skin glue on the cut, leave it on until it falls off. Skin glue is also called skin adhesive or liquid stitches. ? Change the bandage every day. ? Wash the area daily with warm water, and pat it dry. Don't use hydrogen peroxide or alcohol. They can slow healing. ?  You may cover the area with a gauze bandage if it oozes fluid or rubs against clothing. ? You may shower 24 to 48 hours after surgery. Pat the incision dry. Don't swim or take a bath for the first 2 weeks, or until your doctor tells you it is okay. Exercise    · Your rehab program will give you a number of exercises to do to help you get back your knee's range of motion and strength. Always do them as your therapist tells you. Ice    · For pain and swelling, put ice or a cold pack on the area for 10 to 20 minutes at a time. Put a thin cloth between the ice and your skin. If your doctor recommended cold therapy using a portable machine, follow the instructions that came with the machine. Other instructions    · Wear compression stockings if your doctor told you to. These may help to prevent blood clots. Your doctor will tell you how long you need to keep wearing the compression stockings.     · Carry a medical alert card that says you have an artificial joint. You have metal pieces in your knee. These may set off some airport metal detectors. Follow-up care is a key part of your treatment and safety. Be sure to make and go to all appointments, and call your doctor if you are having problems. It's also a good idea to know your test results and keep a list of the medicines you take. When should you call for help? Call 911 anytime you think you may need emergency care. For example, call if:    · You passed out (lost consciousness).     · You have severe trouble breathing.     · You have sudden chest pain and shortness of breath, or you cough up blood. Call your doctor now or seek immediate medical care if:    · You have signs of infection, such as:  ? Increased pain, swelling, warmth, or redness. ? Red streaks leading from the incision. ? Pus draining from the incision. ? A fever.     · You have signs of a blood clot, such as:  ? Pain in your calf, back of the knee, thigh, or groin. ?  Redness and swelling in your leg or groin.     · Your incision comes open and begins to bleed, or the bleeding increases.     · You have pain that does not get better after you take pain medicine. Watch closely for changes in your health, and be sure to contact your doctor if:    · You do not have a bowel movement after taking a laxative. Where can you learn more? Go to http://www.gray.com/  Enter T054 in the search box to learn more about \"Total Knee Replacement: What to Expect at Home. \"  Current as of: July 1, 2021               Content Version: 13.2  © 3723-5445 [employer]. Care instructions adapted under license by [employer] (which disclaims liability or warranty for this information). If you have questions about a medical condition or this instruction, always ask your healthcare professional. [place] [address] any warranty or liability for your use of this information. Information obtained by :  I understand that if any problems occur once I am at home I am to contact my physician. I understand and acknowledge receipt of the instructions indicated above.                                                                                                                                            Physician's or R.N.'s Signature                                                                  Date/Time                                                                                                                                              Patient or Representative Signature                                                          Date/Time

## 2022-04-26 NOTE — PROGRESS NOTES
Discharge instructions and education completed. Prescriptions reviewed with patient and wife at bedside. Opportunity for questions and clarification provided. Patient verbalizes understanding. Patient discharged in stable condition to car via wheelchair.

## 2022-04-26 NOTE — PROGRESS NOTES
2022         Post Op day: 1 Day Post-OpProcedure(s) (LRB):  LEFT ELLI KNEE ARTHROPLASTY TOTAL ROBOTIC ASSISTED/ TIERRA/ ADDUCTOR CANAL BLOCK (Left)      Admit Date: 2022  Admit Diagnosis: Primary osteoarthritis of left knee [M17.12]; Osteoarthritis of left knee [M17.12]    LAB:    Recent Results (from the past 24 hour(s))   HEMOGLOBIN    Collection Time: 22  6:37 PM   Result Value Ref Range    HGB 11.9 (L) 13.6 - 17.2 g/dL   HEMOGLOBIN    Collection Time: 22  4:08 AM   Result Value Ref Range    HGB 11.4 (L) 13.6 - 17.2 g/dL   CREATININE    Collection Time: 22  4:08 AM   Result Value Ref Range    Creatinine 0.94 0.8 - 1.5 MG/DL   VANCOMYCIN, RANDOM    Collection Time: 22  4:08 AM   Result Value Ref Range    Vancomycin, random 11.3 UG/ML     Vital Signs:    Patient Vitals for the past 8 hrs:   BP Temp Pulse Resp SpO2   22 0329 123/80 97.8 °F (36.6 °C) (!) 110 18 94 %   22 2332 (!) 140/72 97.6 °F (36.4 °C) 88 18 95 %     Temp (24hrs), Av °F (36.7 °C), Min:97.6 °F (36.4 °C), Max:98.7 °F (37.1 °C)    Body mass index is 28.63 kg/m².   Pain Control:   Pain Assessment  Pain Scale 1: Numeric (0 - 10)  Pain Intensity 1: 5  Pain Onset 1: postop  Pain Location 1: Knee  Pain Orientation 1: Left  Pain Description 1: Aching  Pain Intervention(s) 1: Medication (see MAR)    Subjective: Doing well, No complaints, No SOB, No Chest Pain, No nausea or vomiting     Objective: Vital Signs are Stable, No Acute Distress, Alert and Oriented, Dressing is dry,  Neurovascular exam is normal.       PT/OT:            Assistive Device: Walker (comment)        LLE AROM  L Knee Flexion: 90 (~post op)  L Knee Extension: -10 (~post op)       Wieght Bearing Status: WBAT    Meds:    Current Facility-Administered Medications:     apixaban (ELIQUIS) tablet 5 mg, 5 mg, Oral, Q12H, Oneil Pino PA    fluticasone propionate (FLONASE) 50 mcg/actuation nasal spray 2 Spray, 2 Spray, Both Nostrils, DAILY, HUNTER Madsen, 2 [place] at 04/25/22 1219    tamsulosin (FLOMAX) capsule 0.4 mg, 0.4 mg, Oral, QHS, HUMERA Montesinos PA    alcohol 62% (NOZIN) nasal  1 Ampule, 1 Ampule, Topical, Q12H, Cuong Madsen, 1 Ampule at 04/25/22 2019    0.9% sodium chloride infusion, 100 mL/hr, IntraVENous, CONTINUOUS, Cuong Madsen, Stopped at 04/25/22 1100    sodium chloride (NS) flush 5-40 mL, 5-40 mL, IntraVENous, Q8H, HUNTER Madsen, 10 mL at 04/25/22 1804    sodium chloride (NS) flush 5-40 mL, 5-40 mL, IntraVENous, PRN, HUNTER Madsen, 10 mL at 04/25/22 1219    acetaminophen (TYLENOL) tablet 1,000 mg, 1,000 mg, Oral, Q6H, HUNTER Madsen, 1,000 mg at 04/26/22 0704    oxyCODONE IR (ROXICODONE) tablet 10 mg, 10 mg, Oral, Q4H PRN, HUNTER Madsen, 10 mg at 04/26/22 0704    HYDROmorphone (DILAUDID) injection 1 mg, 1 mg, IntraVENous, Q3H PRN, HUNTER Madsen    naloxone [facility]) injection 0.2-0.4 mg, 0.2-0.4 mg, IntraVENous, Q10MIN PRN, HUNTER Madsen    dexamethasone (DECADRON) 10 mg/mL injection 10 mg, 10 mg, IntraVENous, ONCE, HUNTER Madsen    ondansetron (ZOFRAN ODT) tablet 4 mg, 4 mg, Oral, Q4H PRN, HUNTER Madesn    diphenhydrAMINE (BENADRYL) capsule 25 mg, 25 mg, Oral, Q4H PRN, HUNTER Madsen    senna-docusate (PERICOLACE) 8.6-50 mg per tablet 2 Tablet, 2 Tablet, Oral, DAILY, HUNTER Madsen    metoprolol tartrate (LOPRESSOR) tablet 25 mg, 25 mg, Oral, Q12H, Amelia Hutchinson NP, 25 mg at 04/25/22 1802    vancomycin (VANCOCIN) 1,000 mg in 0.9% sodium chloride 250 mL ([id number]), 1,000 mg, IntraVENous, Q12H, Daria Bhat MD, Last Rate: 250 mL/hr at 04/26/22 0705, 1,000 mg at 04/26/22 0705     Assessment:   Patient Active Problem List   Diagnosis Code    Snoring R06.83    Primary osteoarthritis of left knee M17.12    Status post left knee replacement Z96.652    Atrial fibrillation, persistent (HCC) I48.19    Benign prostatic hyperplasia with urinary obstruction N40.1, N13.8    Hiatal hernia K44.9    Body mass index (BMI) of 28.0 to 28.9 in adult Z68.28    Primary hypertension I10             Plan:  Continue Physical Therapy, Monitor labs, home today        Signed By: Gutierrez Marinelli MD

## 2022-04-26 NOTE — PROGRESS NOTES
Problem: Self Care Deficits Care Plan (Adult)  Goal: *Acute Goals and Plan of Care (Insert Text)  Outcome: Progressing Towards Goal  Note: GOALS:   DISCHARGE GOALS (in preparation for going home/rehab):  3 days  1. Mr. Anne Mandel will perform one lower body dressing activity with stand by assist required to demonstrate improved functional mobility and safety. 2.  Mr. Anne Mandel will perform one lower body bathing activity with stand by assist required to demonstrate improved functional mobility and safety. 3.  Mr. Anne Mandel will perform toileting/toilet transfer with stand by assist to demonstrate improved functional mobility and safety. 4.  Mr. Anne aMndel will perform shower transfer with stand by assist to demonstrate improved functional mobility and safety. JOINT CAMP OCCUPATIONAL THERAPY TKA: Daily Note and Discharge 4/26/2022  OUTPATIENT: Hospital Day: 2  [place] / [place] / Product Type: Medicare /      NAME/AGE/GENDER: Sofy Morillo is a 81 y.o. male   PRIMARY DIAGNOSIS:  Primary osteoarthritis of left knee [M17.12]   Procedure(s) and Anesthesia Type:     * LEFT ELLI KNEE ARTHROPLASTY TOTAL ROBOTIC ASSISTED/ TIERRA/ ADDUCTOR CANAL BLOCK - Spinal (Left)  ICD-10: Treatment Diagnosis:    · Pain in Left Knee (M25.562)  · Stiffness of Left Knee, Not elsewhere classified (N21.309)      ASSESSMENT:      Mr. Anne Mandel is s/p left TKA and presents with decreased weight bearing on L LE and decreased independence with functional mobility and activities of daily living. Patient completed shower and dressing as charted below in ADL grid and is ambulating with rolling walker and stand by assist.  Patient has met 4/4 goals and plans to return home with good family support. Will do well at home for self cares and transfers during ADL's.  D/C OT for acute deficits.      This section established at most recent assessment   PROBLEM LIST (Impairments causing functional limitations):  1. Decreased Strength  2. Decreased ADL/Functional Activities  3. Decreased Transfer Abilities  4. Increased Pain  5. Increased Fatigue  6. Decreased Flexibility/Joint Mobility  7. Decreased Knowledge of Precautions   INTERVENTIONS PLANNED: (Benefits and precautions of occupational therapy have been discussed with the patient.)  1. Activities of daily living training  2. Adaptive equipment training  3. Balance training  4. Clothing management  5. Donning&doffing training  6. Theraputic activity     TREATMENT PLAN: Frequency/Duration: Follow patient 1-2tx to address above goals. Rehabilitation Potential For Stated Goals: Good     RECOMMENDED REHABILITATION/EQUIPMENT: (at time of discharge pending progress): Continue Skilled Therapy. OCCUPATIONAL PROFILE AND HISTORY:   History of Present Injury/Illness (Reason for Referral): Pt presents this date s/p (Left) TKA. Past Medical History/Comorbidities:   Mr. Jenaro Live  has a past medical history of A-fib [facility]), Frequent urination, and Hiatal hernia. Mr. Jenaro Live  has a past surgical history that includes hx back surgery; hx heart catheterization (2008); hx heart catheterization (2018); hx myringotomy; hx implantable loop recorder (03/24/2021); hx tonsil and adenoidectomy; and hx vasectomy. Social History/Living Environment:   Home Environment: Private residence  # Steps to Enter: 1  Rails to Enter: No  One/Two Story Residence: Two story  # of Interior Steps: 15  Interior Rails: Right  Living Alone: No  Support Systems: Spouse/Significant Other  Patient Expects to be Discharged to[de-identified] Home with home health  Current DME Used/Available at Home: None  Tub or Shower Type: Tub/Shower combination    Prior Level of Function/Work/Activity:  Independent prior.       Number of Personal Factors/Comorbidities that affect the Plan of Care: Brief history (0):  LOW COMPLEXITY   ASSESSMENT OF OCCUPATIONAL PERFORMANCE[de-identified]   Most Recent Physical Functioning: Balance  Sitting: Intact; Without support  Standing: Impaired; With support                  LLE AROM  L Knee Flexion: 96  L Knee Extension: 6 Coordination  Fine Motor Skills-Upper: Left Intact; Right Intact  Gross Motor Skills-Upper: Left Intact; Right Intact         Mental Status  Neurologic State: Alert  Orientation Level: Oriented X4  Cognition: Appropriate decision making; Appropriate for age attention/concentration; Appropriate safety awareness  Perception: Appears intact                Basic ADLs (From Assessment) Complex ADLs (From Assessment)   Basic ADL  Feeding: Independent  Oral Facial Hygiene/Grooming: Supervision  Bathing: Supervision  Type of Bath: Chlorhexidine (CHG),Full,Shower  Upper Body Dressing: Supervision  Lower Body Dressing: Minimum assistance  Toileting: Supervision     Grooming/Bathing/Dressing Activities of Daily Living                       Functional Transfers  Bathroom Mobility: Supervision/set up  Toilet Transfer : Supervision  Shower Transfer: Supervision   Lower Body Dressing Assistance  Underpants: Supervision  Pants With Elastic Waist: Supervision  Socks: Moderate assistance Bed/Mat Mobility  Supine to Sit: Stand-by assistance  Sit to Supine: Stand-by assistance  Sit to Stand: Stand-by assistance  Stand to Sit: Stand-by assistance  Bed to Chair: Stand-by assistance  Scooting: Stand-by assistance         Physical Skills Involved:  1. Range of Motion  2. Balance  3. Strength  4. Activity Tolerance Cognitive Skills Affected (resulting in the inability to perform in a timely and safe manner):  1. [facility] Psychosocial Skills Affected:  1. WFL   Number of elements that affect the Plan of Care: 1-3:  LOW COMPLEXITY   CLINICAL DECISION MAKING:   [employer] AM-PAC 6 Clicks   Daily Activity Inpatient Short Form  How much help from another person does the patient currently need. .. Total A Lot A Little None   1. Putting on and taking off regular lower body clothing?    [] 1   [] 2   [x] 3 [] 4   2. Bathing (including washing, rinsing, drying)? [] 1   [] 2   [x] 3   [] 4   3. Toileting, which includes using toilet, bedpan or urinal?   [] 1   [] 2   [x] 3   [] 4   4. Putting on and taking off regular upper body clothing? [] 1   [] 2   [] 3   [x] 4   5. Taking care of personal grooming such as brushing teeth? [] 1   [] 2   [] 3   [x] 4   6. Eating meals? [] 1   [] 2   [] 3   [x] 4   © 2007, Trustees of [employer], under license to [employer]. All rights reserved     Score:  Initial: 18 Most Recent: 21 (Date: 4/26/2022 )    Interpretation of Tool:  Represents activities that are increasingly more difficult (i.e. Bed mobility, Transfers, Gait). Medical Necessity:     · Skilled intervention continues to be required due to Deficits noted above. Reason for Services/Other Comments:  · Patient continues to require skilled intervention due to   · New TKA  · . Use of outcome tool(s) and clinical judgement create a POC that gives a: MODERATE COMPLEXITY            TREATMENT:   (In addition to Assessment/Re-Assessment sessions the following treatments were rendered)     Pre-treatment Symptoms/Complaints:    Pain: Initial:   Pain Intensity 1: 2  Pain Location 1: Knee  Pain Orientation 1: Left  Post Session:  2     Self Care: (40): Procedure(s) (per grid) utilized to improve and/or restore self-care/home management as related to dressing, toileting, and grooming. Required minimal verbal and tactile cueing to facilitate activities of daily living skills. Treatment/Session Assessment:     Response to Treatment:  Good, sitting up in recliner.     Education:  [] Home Exercises  [x] Fall Precautions  [] Hip Precautions [] Going Home Video  [x] Knee/Hip Prosthesis Review  [x] Walker Management/Safety [x] Adaptive Equipment as Needed       Interdisciplinary Collaboration:   o Physical Therapist  o Occupational Therapist  o Registered Nurse    After treatment position/precautions:   o Up in chair  o Bed/Chair-wheels locked  o Caregiver at bedside  o Call light within reach  o RN notified     Compliance with Program/Exercises: Compliant all of the time, Will assess as treatment progresses. Recommendations/Intent for next treatment session:  D/C OT for acute deficits.       Total Treatment Duration:  OT Patient Time In/Time Out  Time In: 0750  Time Out: Abhi 224, OT

## 2022-04-26 NOTE — PROGRESS NOTES
Problem: Mobility Impaired (Adult and Pediatric)  Goal: *Acute Goals and Plan of Care (Insert Text)  Outcome: Progressing Towards Goal  Note: GOALS (1-4 days):  (1.)Mr. Alex Toussaint will move from supine to sit and sit to supine  in bed with SUPERVISION. (2.)Mr. Alex Toussaint will transfer from bed to chair and chair to bed with STAND BY ASSIST using the least restrictive device. (3.)Mr. Alex Toussaint will ambulate with STAND BY ASSIST for 300 feet with the least restrictive device. 4/26  (4.)Mr. Alex Toussaint will ambulate up/down 1 steps with no railing with CONTACT GUARD ASSIST with walker, also pt able to go up & down 14 steps with right rail, device PRN & CGA  (5.)Mr. Alex Toussaint will increase left knee ROM to 0°-90°. 1/2 met 4/26  ________________________________________________________________________________________________      PHYSICAL THERAPY JOINT CAMP TKA: Daily Note and AM 4/26/2022  OUTPATIENT: Hospital Day: 2  [place] / Product Type: Medicare /      NAME/AGE/GENDER: Angelina Kong is a 81 y.o. male   PRIMARY DIAGNOSIS:  Primary osteoarthritis of left knee [M17.12]   Procedure(s) and Anesthesia Type:     * LEFT ELLI KNEE ARTHROPLASTY TOTAL ROBOTIC ASSISTED/ TIERRA/ ADDUCTOR CANAL BLOCK - Spinal (Left)  ICD-10: Treatment Diagnosis:    · Pain in Left Knee (M25.562)  · Stiffness of Left Knee, Not elsewhere classified (M25.662)  · Difficulty in walking, Not elsewhere classified (R26.2)      ASSESSMENT:     Mr. Alex Toussaint presents with impaired strength & mobility s/p left TKA. Pt also had decreased stability during out of bed activity. This pt will benefit from follow up therapy to help restore safe function prior to returning home with caregiver. 4/26 sitting in the chair upon arrival.  Performs L knee exercises with good technique. Sat on edge of  chair put his shoe on with some help. Sit>stand with SBA and support from the walker.   Ambulated 150 ft to the gym using RW with SBA.  Therapist instructed/pt demonstrated going up/down stairs with R rail and cane L hand, x 2 then walk over and did the curb with good technique. Rested few min then ambulated another 150 ft back to the room. Return to the chair with needs in reach, ice to left knee and instructed to call for assist, before getting up. Therapist issued PT D/C and review cool jet. All question answer and ready for D/C. This section established at most recent assessment   PROBLEM LIST (Impairments causing functional limitations):  1. Decreased Strength  2. Decreased ADL/Functional Activities  3. Decreased Transfer Abilities  4. Decreased Ambulation Ability/Technique  5. Decreased Balance  6. Increased Pain  7. Decreased Activity Tolerance  8. Decreased Flexibility/Joint Mobility  9. Decreased [place] with Home Exercise Program   INTERVENTIONS PLANNED: (Benefits and precautions of physical therapy have been discussed with the patient.)  1. Bed Mobility  2. Cold  3. Gait Training  4. Home Exercise Program (HEP)  5. Range of Motion (ROM)  6. Therapeutic Activites  7. Therapeutic Exercise/Strengthening  8. Transfer Training     TREATMENT PLAN: Frequency/Duration: Follow patient BID for duration of hospital stay to address above goals. Rehabilitation Potential For Stated Goals: Good     RECOMMENDED REHABILITATION/EQUIPMENT: (at time of discharge pending progress): Continue Skilled Therapy and Home Health: Physical Therapy. HISTORY:   History of Present Injury/Illness (Reason for Referral):  Left TKA  Past Medical History/Comorbidities:   Mr. Anne Mandel  has a past medical history of A-fib ([facility] [address] Frequent urination, and Hiatal hernia. Mr. Anne Mandel  has a past surgical history that includes hx back surgery; hx heart catheterization (2008); hx heart catheterization (2018); hx myringotomy; hx implantable loop recorder (03/24/2021); hx tonsil and adenoidectomy; and hx vasectomy.   Social History/Living Environment: Home Environment: Private residence  # Steps to Enter: 1  Rails to Enter: No  One/Two Story Residence: Two story  # of Interior Steps: 15  Interior Rails: Right  Living Alone: No  Support Systems: Spouse/Significant Other  Patient Expects to be Discharged to[de-identified] Home with home health  Current DME Used/Available at Home: None  Tub or Shower Type: Tub/Shower combination      Prior Level of Function/Work/Activity:  Pt was independent without an assistive device   Number of Personal Factors/Comorbidities that affect the Plan of Care: 3+: HIGH COMPLEXITY   EXAMINATION:   Most Recent Physical Functioning:                 LLE AROM  L Knee Flexion: 96  L Knee Extension: 6               Transfers  Sit to Stand: Stand-by assistance  Stand to Sit: Stand-by assistance  Bed to Chair: Stand-by assistance    Balance  Sitting: Intact; Without support  Standing: Impaired; With support              Weight Bearing Status  Left Side Weight Bearing: As tolerated  Distance (ft): 300 Feet (ft)  Ambulation - Level of Assistance: Stand-by assistance  Assistive Device: Walker, rolling  Speed/Suri: Delayed  Step Length: Right shortened  Stance: Left decreased  Gait Abnormalities: Antalgic;Decreased step clearance  Number of Stairs Trained: 6 (x 2)  Stairs - Level of Assistance: Stand-by assistance  Rail Use: Right  (cane in left hand)     Braces/Orthotics: none    Left Knee Cold  Type: Cryocuff      Body Structures Involved:  1. Joints  2. Muscles Body Functions Affected:  1. Sensory/Pain  2. Movement Related Activities and Participation Affected:  1. General Tasks and Demands  2. Mobility   Number of elements that affect the Plan of Care: 4+: HIGH COMPLEXITY   CLINICAL PRESENTATION:   Presentation: Stable and uncomplicated: LOW COMPLEXITY   CLINICAL DECISION MAKING:   [employer] AM-PAC 6 Clicks   Basic Mobility Inpatient Short Form  How much difficulty does the patient currently have. .. Unable A Lot A Little None   1.   Turning over in bed (including adjusting bedclothes, sheets and blankets)? [] 1   [] 2   [x] 3   [] 4   2. Sitting down on and standing up from a chair with arms ( e.g., wheelchair, bedside commode, etc.)   [] 1   [] 2   [x] 3   [] 4   3. Moving from lying on back to sitting on the side of the bed? [] 1   [] 2   [x] 3   [] 4   How much help from another person does the patient currently need. .. Total A Lot A Little None   4. Moving to and from a bed to a chair (including a wheelchair)? [] 1   [] 2   [x] 3   [] 4   5. Need to walk in hospital room? [] 1   [] 2   [x] 3   [] 4   6. Climbing 3-5 steps with a railing? [] 1   [] 2   [x] 3   [] 4   © 2007, Trustees of [address] [place] [address], under license to [employer]. All rights reserved     Score:  Initial: 16 Most Recent: X (Date: -- )    Interpretation of Tool:  Represents activities that are increasingly more difficult (i.e. Bed mobility, Transfers, Gait). Medical Necessity:     · Patient is expected to demonstrate progress in   · strength, range of motion, balance, coordination, and functional technique  ·  to   · decrease assistance required with bed mobility, transfers & gait  · .  Reason for Services/Other Comments:  · Patient continues to require skilled intervention due to   · Pt not independent with functional mobility  · . Use of outcome tool(s) and clinical judgement create a POC that gives a: Clear prediction of patient's progress: LOW COMPLEXITY            TREATMENT:   (In addition to Assessment/Re-Assessment sessions the following treatments were rendered)     Pre-treatment Symptoms/Complaints:  pt agreeable  Pain Initial: numeric scale  Pain Intensity 1: 2 (a little more pain during gait)  Post Session:       Gait Training ( ):  Gait training to improve and/or restore physical functioning as related to mobility.   Ambulated 300 Feet (ft) with Stand-by assistance using a Walker, rolling and minimal   related to their knee position and motion to promote proper body alignment. ITherapeutic Activity: (  40 Minutes ):  Therapeutic activities including Chair transfers and Ambulation on level ground to improve mobility. Required minimal      Date:  4/25 Date:  4/26 Date:     ACTIVITY/EXERCISE AM PM AM PM AM PM   GROUP THERAPY  []  []  []  []  []  []   Ankle Pumps  20 20      Quad Sets  20 20      Gluteal Sets  20 20      Hip ABd/ADduction  20 20      Straight Leg Raises  20 20      Knee Slides  20 20      Short Arc Quads  20 20      Long Arc Quads         Chair Slides  20 20               B = bilateral; AA = active assistive; A = active; P = passive      Treatment/Session Assessment:     Response to Treatment:  tolerated well,  Ready for D/C    Education:  [x] Home Exercises  [x] Fall Precautions  [x] Use of Cold Therapy Unit [] D/C Instruction Review  [] Knee Prosthesis Review  [x] Walker Management/Safety [] Adaptive Equipment as Needed       Interdisciplinary Collaboration:   o Registered Nurse    After treatment position/precautions:   o Up in chair  o Bed/Chair-wheels locked  o Call light within reach  o RN notified    Compliance with Program/Exercises: Will assess as treatment progresses. Recommendations/Intent for next treatment session:  Treatment next visit will focus on increasing Mr. Graham Zayas independence with bed mobility, transfers, gait training, strength/ROM exercises, modalities for pain, and patient education.       Total Treatment Duration:  PT Patient Time In/Time Out  Time In: 0715  Time Out: 2202 Michael Teresa, PTA

## 2022-04-26 NOTE — PROGRESS NOTES
Hospitalist Progress Note   Admit Date:  2022  5:32 AM   Name:  Roselyn Lopez   Age:  81 y.o. Sex:  male  :  1940   MRN:  [id number]   Room:  [facility]/    Presenting Complaint: No chief complaint on file. Reason(s) for Admission: Primary osteoarthritis of left knee [M17.12]  Osteoarthritis of left knee [facility] Course & Interval History:   81 y.o. male with history of A Fib, BPH, hiatal hernia who was admitted for left knee arthroplasty. Hospitalist asked to consult for medical management. Subjective/24hr Events (22):  Plans to make trip to [facility]. Constipated. Pain controlled. Encouraged more PT.    ROS:  10 systems reviewed and negative except as noted above.      Assessment & Plan:    Status post left knee replacement (2022)    Per ortho.     Active Problems:    Osteoarthritis of left knee (2022)    Per ortho.       Atrial fibrillation (HCC) (2022)    Resumed Lopressor 25 mg bid home dose  Eliquis resumed per ortho.       BPH (benign prostatic hyperplasia) (2022)    Flomax resumed    Constipation  -PEG daily x3     Diet:  ADULT DIET Regular  DVT PPx: SCDs  Code status: Full Code    Hospital Problems as of 2022 Date Reviewed: 2022          Codes Class Noted - Resolved POA    * (Principal) Status post left knee replacement ICD-10-CM: H06.323  ICD-9-CM: V43.65  2022 - Present Yes        Benign prostatic hyperplasia with urinary obstruction ICD-10-CM: N40.1, N13.8  ICD-9-CM: 600.01, 599.69  2022 - Present Yes        Hiatal hernia ICD-10-CM: K44.9  ICD-9-CM: 553.3  2022 - Present Yes        Primary hypertension (Chronic) ICD-10-CM: I10  ICD-9-CM: 401.9  2020 - Present Yes        Primary osteoarthritis of left knee ICD-10-CM: M17.12  ICD-9-CM: 715.16  2019 - Present Yes        Body mass index (BMI) of 28.0 to 28.9 in adult (Chronic) ICD-10-CM: Q21.89  ICD-9-CM: V85.24  2019 - Present Yes        Atrial fibrillation, persistent (HCC) (Chronic) ICD-10-CM: I48.19  ICD-9-CM: 427.31  2/12/2014 - Present Yes              Objective:     Patient Vitals for the past 24 hrs:   Temp Pulse Resp BP SpO2   04/26/22 1120 98.4 °F (36.9 °C) 87 20 (!) 140/84 98 %   04/26/22 0730 98.4 °F (36.9 °C) 90 18 137/77 97 %   04/26/22 0329 97.8 °F (36.6 °C) (!) 110 18 123/80 94 %   04/25/22 2332 97.6 °F (36.4 °C) 88 18 (!) 140/72 95 %   04/25/22 1934 98.2 °F (36.8 °C) (!) 109 16 (!) 142/64 94 %   04/25/22 1500 98.7 °F (37.1 °C) (!) 108 18 135/71 90 %   04/25/22 1458 -- -- -- -- 92 %     Oxygen Therapy  O2 Sat (%): 98 % (04/26/22 1120)  Pulse via Oximetry: 91 beats per minute (04/25/22 1458)  O2 Device: None (Room air) (04/25/22 1458)  O2 Flow Rate (L/min): 0 l/min (04/25/22 1458)    Estimated body mass index is 28.63 kg/m² as calculated from the following:    Height as of this encounter: 6' (1.829 m). Weight as of this encounter: 95.8 kg (211 lb 1.6 oz). Intake/Output Summary (Last 24 hours) at 4/26/2022 1127  Last data filed at 4/25/2022 1529  Gross per 24 hour   Intake --   Output 550 ml   Net -550 ml         Blood pressure (!) 140/84, pulse 87, temperature 98.4 °F (36.9 °C), resp. rate 20, height 6' (1.829 m), weight 95.8 kg (211 lb 1.6 oz), SpO2 98 %. Physical Exam  Vitals and nursing note reviewed. Constitutional:       General: He is not in acute distress. Appearance: Normal appearance. HENT:      Head: Normocephalic. Eyes:      Extraocular Movements: Extraocular movements intact. Cardiovascular:      Rate and Rhythm: Normal rate. Pulmonary:      Effort: Pulmonary effort is normal. No respiratory distress. Musculoskeletal:         General: No deformity. Cervical back: No rigidity. Comments: L Knee in cooling device   Skin:     General: Skin is warm and dry. Neurological:      General: No focal deficit present. Mental Status: He is alert.    Psychiatric:         Mood and Affect: Mood normal. Behavior: Behavior normal.         I have reviewed ordered lab tests and independently visualized imaging below:    Recent Labs:  Recent Results (from the past 48 hour(s))   HEMOGLOBIN    Collection Time: 04/25/22  6:37 PM   Result Value Ref Range    HGB 11.9 (L) 13.6 - 17.2 g/dL   HEMOGLOBIN    Collection Time: 04/26/22  4:08 AM   Result Value Ref Range    HGB 11.4 (L) 13.6 - 17.2 g/dL   CREATININE    Collection Time: 04/26/22  4:08 AM   Result Value Ref Range    Creatinine 0.94 0.8 - 1.5 MG/DL   VANCOMYCIN, RANDOM    Collection Time: 04/26/22  4:08 AM   Result Value Ref Range    Vancomycin, random 11.3 UG/ML       All Micro Results     None          Other Studies:  No results found.     Current Meds:  Current Facility-Administered Medications   Medication Dose Route Frequency    polyethylene glycol (MIRALAX) packet 17 g  17 g Oral DAILY    apixaban (ELIQUIS) tablet 5 mg  5 mg Oral Q12H    fluticasone propionate (FLONASE) 50 mcg/actuation nasal spray 2 Spray  2 Spray Both Nostrils DAILY    tamsulosin (FLOMAX) capsule 0.4 mg  0.4 mg Oral QHS    alcohol 62% (NOZIN) nasal  1 Ampule  1 Ampule Topical Q12H    0.9% sodium chloride infusion  100 mL/hr IntraVENous CONTINUOUS    sodium chloride (NS) flush 5-40 mL  5-40 mL IntraVENous Q8H    sodium chloride (NS) flush 5-40 mL  5-40 mL IntraVENous PRN    acetaminophen (TYLENOL) tablet 1,000 mg  1,000 mg Oral Q6H    oxyCODONE IR (ROXICODONE) tablet 10 mg  10 mg Oral Q4H PRN    HYDROmorphone (DILAUDID) injection 1 mg  1 mg IntraVENous Q3H PRN    naloxone (NARCAN) injection 0.2-0.4 mg  0.2-0.4 mg IntraVENous Q10MIN PRN    dexamethasone (DECADRON) 10 mg/mL injection 10 mg  10 mg IntraVENous ONCE    ondansetron (ZOFRAN ODT) tablet 4 mg  4 mg Oral Q4H PRN    diphenhydrAMINE (BENADRYL) capsule 25 mg  25 mg Oral Q4H PRN    senna-docusate (PERICOLACE) 8.6-50 mg per tablet 2 Tablet  2 Tablet Oral DAILY    metoprolol tartrate (LOPRESSOR) tablet 25 mg  25 mg Oral Q12H    vancomycin (VANCOCIN) 1,000 mg in 0.9% sodium chloride 250 mL ([id number])  1,000 mg IntraVENous Q12H       Signed:  Landry Squires MD

## 2022-05-25 ENCOUNTER — TELEPHONE (OUTPATIENT)
Dept: ORTHOPEDIC SURGERY | Age: 82
End: 2022-05-25

## 2022-05-25 ENCOUNTER — HOSPITAL ENCOUNTER (EMERGENCY)
Dept: CT IMAGING | Age: 82
Discharge: HOME | End: 2022-05-28
Payer: MEDICARE

## 2022-05-25 ENCOUNTER — HOSPITAL ENCOUNTER (EMERGENCY)
Dept: GENERAL RADIOLOGY | Age: 82
Discharge: HOME | End: 2022-05-28
Payer: MEDICARE

## 2022-05-25 ENCOUNTER — HOSPITAL ENCOUNTER (EMERGENCY)
Age: 82
Discharge: HOME | End: 2022-05-25
Attending: EMERGENCY MEDICINE
Payer: MEDICARE

## 2022-05-25 VITALS
WEIGHT: 205 LBS | HEIGHT: 72 IN | TEMPERATURE: 98 F | DIASTOLIC BLOOD PRESSURE: 93 MMHG | HEART RATE: 85 BPM | BODY MASS INDEX: 27.77 KG/M2 | OXYGEN SATURATION: 97 % | SYSTOLIC BLOOD PRESSURE: 159 MMHG | RESPIRATION RATE: 18 BRPM

## 2022-05-25 DIAGNOSIS — R06.02 SHORTNESS OF BREATH: Primary | ICD-10-CM

## 2022-05-25 DIAGNOSIS — I48.11 LONGSTANDING PERSISTENT ATRIAL FIBRILLATION (HCC): ICD-10-CM

## 2022-05-25 DIAGNOSIS — R42 DIZZINESS: ICD-10-CM

## 2022-05-25 LAB
ALBUMIN SERPL-MCNC: 3.6 G/DL (ref 3.2–4.6)
ALBUMIN/GLOB SERPL: 0.9 {RATIO} (ref 1.2–3.5)
ALP SERPL-CCNC: 105 U/L (ref 50–136)
ALT SERPL-CCNC: 26 U/L (ref 12–65)
ANION GAP SERPL CALC-SCNC: 4 MMOL/L (ref 7–16)
AST SERPL-CCNC: 19 U/L (ref 15–37)
BASOPHILS # BLD: 0 K/UL (ref 0–0.2)
BASOPHILS NFR BLD: 0 % (ref 0–2)
BILIRUB SERPL-MCNC: 1 MG/DL (ref 0.2–1.1)
BUN SERPL-MCNC: 16 MG/DL (ref 8–23)
CALCIUM SERPL-MCNC: 9.3 MG/DL (ref 8.3–10.4)
CHLORIDE SERPL-SCNC: 106 MMOL/L (ref 98–107)
CO2 SERPL-SCNC: 29 MMOL/L (ref 21–32)
CREAT SERPL-MCNC: 1 MG/DL (ref 0.8–1.5)
DIFFERENTIAL METHOD BLD: ABNORMAL
EKG ATRIAL RATE: 394 BPM
EKG DIAGNOSIS: NORMAL
EKG Q-T INTERVAL: 366 MS
EKG QRS DURATION: 92 MS
EKG QTC CALCULATION (BAZETT): 462 MS
EKG R AXIS: 63 DEGREES
EKG T AXIS: 41 DEGREES
EKG VENTRICULAR RATE: 96 BPM
EOSINOPHIL # BLD: 0.2 K/UL (ref 0–0.8)
EOSINOPHIL NFR BLD: 3 % (ref 0.5–7.8)
ERYTHROCYTE [DISTWIDTH] IN BLOOD BY AUTOMATED COUNT: 12.8 % (ref 11.9–14.6)
GLOBULIN SER CALC-MCNC: 4 G/DL (ref 2.3–3.5)
GLUCOSE SERPL-MCNC: 101 MG/DL (ref 65–100)
HCT VFR BLD AUTO: 38.1 % (ref 41.1–50.3)
HGB BLD-MCNC: 12.2 G/DL (ref 13.6–17.2)
IMM GRANULOCYTES # BLD AUTO: 0 K/UL (ref 0–0.5)
IMM GRANULOCYTES NFR BLD AUTO: 0 % (ref 0–5)
LYMPHOCYTES # BLD: 1.1 K/UL (ref 0.5–4.6)
LYMPHOCYTES NFR BLD: 16 % (ref 13–44)
MCH RBC QN AUTO: 31 PG (ref 26.1–32.9)
MCHC RBC AUTO-ENTMCNC: 32 G/DL (ref 31.4–35)
MCV RBC AUTO: 96.7 FL (ref 79.6–97.8)
MONOCYTES # BLD: 0.8 K/UL (ref 0.1–1.3)
MONOCYTES NFR BLD: 12 % (ref 4–12)
NEUTS SEG # BLD: 4.7 K/UL (ref 1.7–8.2)
NEUTS SEG NFR BLD: 69 % (ref 43–78)
NRBC # BLD: 0 K/UL (ref 0–0.2)
PLATELET # BLD AUTO: 221 K/UL (ref 150–450)
PMV BLD AUTO: 9.6 FL (ref 9.4–12.3)
POTASSIUM SERPL-SCNC: 4.3 MMOL/L (ref 3.5–5.1)
PROT SERPL-MCNC: 7.6 G/DL (ref 6.3–8.2)
RBC # BLD AUTO: 3.94 M/UL (ref 4.23–5.6)
SODIUM SERPL-SCNC: 139 MMOL/L (ref 136–145)
TROPONIN I SERPL HS-MCNC: 10.4 PG/ML (ref 0–14)
WBC # BLD AUTO: 6.9 K/UL (ref 4.3–11.1)

## 2022-05-25 PROCEDURE — 99285 EMERGENCY DEPT VISIT HI MDM: CPT

## 2022-05-25 PROCEDURE — 85025 COMPLETE CBC W/AUTO DIFF WBC: CPT

## 2022-05-25 PROCEDURE — 71260 CT THORAX DX C+: CPT

## 2022-05-25 PROCEDURE — 84484 ASSAY OF TROPONIN QUANT: CPT

## 2022-05-25 PROCEDURE — 80053 COMPREHEN METABOLIC PANEL: CPT

## 2022-05-25 PROCEDURE — 93005 ELECTROCARDIOGRAM TRACING: CPT | Performed by: NURSE PRACTITIONER

## 2022-05-25 PROCEDURE — 71046 X-RAY EXAM CHEST 2 VIEWS: CPT

## 2022-05-25 PROCEDURE — 6360000004 HC RX CONTRAST MEDICATION: Performed by: EMERGENCY MEDICINE

## 2022-05-25 RX ADMIN — IOPAMIDOL 100 ML: 755 INJECTION, SOLUTION INTRAVENOUS at 14:53

## 2022-05-25 ASSESSMENT — PAIN DESCRIPTION - LOCATION: LOCATION: KNEE

## 2022-05-25 ASSESSMENT — PAIN DESCRIPTION - DESCRIPTORS: DESCRIPTORS: ACHING

## 2022-05-25 ASSESSMENT — PAIN - FUNCTIONAL ASSESSMENT: PAIN_FUNCTIONAL_ASSESSMENT: 0-10

## 2022-05-25 ASSESSMENT — PAIN SCALES - GENERAL
PAINLEVEL_OUTOF10: 0
PAINLEVEL_OUTOF10: 3

## 2022-05-25 ASSESSMENT — ENCOUNTER SYMPTOMS
DIARRHEA: 0
VOMITING: 0
SHORTNESS OF BREATH: 1

## 2022-05-25 ASSESSMENT — PAIN DESCRIPTION - ORIENTATION: ORIENTATION: LEFT

## 2022-05-25 NOTE — ED PROVIDER NOTES
[employer] Emergency Department Provider Note                   PCP:                Tanisha Saeed MD               Age: 81 y.o. Sex: male       ICD-10-CM    1. Shortness of breath  R06.02    2. Dizziness  R42    3. Longstanding persistent atrial fibrillation (HCC)  I48.11        DISPOSITION Decision To Discharge 05/25/2022 05:44:48 PM       New Prescriptions    No medications on file       Orders Placed This Encounter   Procedures    XR CHEST (2 VW)    CT CHEST PULMONARY EMBOLISM W CONTRAST    CMP    CBC with Auto Differential    Troponin    Orthostatic blood pressure and pulse    EKG 12 Lead    EKG 12 Lead         Kalpesh Walls is a 81 y.o. male who presents to the Emergency Department with chief complaint of    Chief Complaint   Patient presents with    Leg Pain    Dizziness      Patient is an 13-year-old male with a history of atrial fibrillation on Eliquis status post left knee replacement 2 weeks ago who presents with shortness of breath and dizziness. He says home health was at his house today, stood him up to do some physical therapy. He became dizzy and short of breath. She checked his O2 sat, was 88% on room air and slowly improved to 95% on room air. He started feeling better at that time. He denies any chest pain. He has had some swelling to his left knee which is not worse, has been persisting since the surgery. The home health nurse called the orthopedist who told the patient to come to the ER for evaluation. Review of Systems   Constitutional: Negative for chills and fever. Respiratory: Positive for shortness of breath. Gastrointestinal: Negative for diarrhea and vomiting. All other systems reviewed and are negative. All other systems reviewed and are negative.       Past Medical History:   Diagnosis Date    A-fib [facility])     cardioversion    Frequent urination     taking flomax    Hiatal hernia         Past Surgical History:   Procedure Laterality Date  BACK SURGERY      lumbar fusion    CARDIAC CATHETERIZATION  2008    CARDIAC CATHETERIZATION  2018    left heart cath femoral approach, right heart cath    INSERTABLE CARDIAC MONITOR  03/24/2021    Dr. Meyer Ricki MYRINGOTOMY      w/ tubes    TONSILLECTOMY AND ADENOIDECTOMY      VASECTOMY          Family History   Problem Relation Age of Onset    Lung Disease Brother            Social Connections:     Frequency of Communication with Friends and Family: Not on file    Frequency of Social Gatherings with Friends and Family: Not on file    Attends Jain Services: Not on file    Active Member of Clubs or Organizations: Not on file    Attends Club or Organization Meetings: Not on file    Marital Status: Not on file        Allergies   Allergen Reactions    Finasteride Other (See Comments)        Vitals signs and nursing note reviewed. Patient Vitals for the past 4 hrs:   Pulse Resp BP SpO2   05/25/22 1430 84 18 130/73 97 %   05/25/22 1415 93 16 124/62 99 %   05/25/22 1412 93 -- 135/73 --   05/25/22 1410 88 -- (!) 142/75 --          Physical Exam  Vitals and nursing note reviewed. Constitutional:       General: He is not in acute distress. Appearance: Normal appearance. HENT:      Head: Normocephalic and atraumatic. Eyes:      General:         Right eye: No discharge. Left eye: No discharge. Conjunctiva/sclera: Conjunctivae normal.   Cardiovascular:      Rate and Rhythm: Normal rate. Rhythm irregular. Pulmonary:      Effort: Pulmonary effort is normal. No respiratory distress. Abdominal:      General: There is no distension. Palpations: Abdomen is soft. Tenderness: There is no abdominal tenderness. Musculoskeletal:         General: Swelling present. Right lower leg: No edema. Left lower leg: Edema present.       Comments: Left knee with slight swelling and warmth, minimal erythema around the incision site consistent with normal wound healing   Skin:     General: Skin is warm. Neurological:      Mental Status: He is alert. Psychiatric:         Mood and Affect: Mood normal.         Behavior: Behavior normal.          MDM  Number of Diagnoses or Management Options  Dizziness: new, no workup  Longstanding persistent atrial fibrillation (HCC)  Shortness of breath: new, no workup  Diagnosis management comments: 5:46 PM discussed results with patient, unremarkable work-up. His knee is swollen but shows changes typical of postoperative healing. He has a normal white count and no fever. He has an appointment with his orthopedist coming up as well as with Dr. Kathryn Harry for a possible ablation.        Amount and/or Complexity of Data Reviewed  Clinical lab tests: reviewed and ordered  Tests in the radiology section of CPT®: ordered and reviewed  Tests in the medicine section of CPT®: ordered and reviewed    Risk of Complications, Morbidity, and/or Mortality  Presenting problems: moderate  Diagnostic procedures: moderate  Management options: moderate    Patient Progress  Patient progress: stable      EKG 12 Lead    Date/Time: 5/25/2022 5:46 PM  Performed by: Marek Sequeira MD  Authorized by: Mraek Sequeira MD     ECG reviewed by ED Physician in the absence of a cardiologist: yes    Previous ECG:     Previous ECG:  Unavailable  Rate:     ECG rate:  82    ECG rate assessment: normal    Rhythm:     Rhythm: atrial fibrillation    Ectopy:     Ectopy: none    QRS:     QRS axis:  Normal    QRS intervals:  Normal  Conduction:     Conduction: normal    ST segments:     ST segments:  Normal  T waves:     T waves: normal          Labs Reviewed   COMPREHENSIVE METABOLIC PANEL - Abnormal; Notable for the following components:       Result Value    Anion Gap 4 (*)     Glucose 101 (*)     Globulin 4.0 (*)     Albumin/Globulin Ratio 0.9 (*)     All other components within normal limits   CBC WITH AUTO DIFFERENTIAL - Abnormal; Notable for the following components: RBC 3.94 (*)     Hemoglobin 12.2 (*)     Hematocrit 38.1 (*)     All other components within normal limits   TROPONIN        CT CHEST PULMONARY EMBOLISM W CONTRAST   Final Result      1. No evidence of pulmonary embolic disease. .      CPT code(s) 59952                  XR CHEST (2 VW)   Final Result      1. No acute cardiopulmonary process. CPT code(s) 59444                           [place] Coma Scale  Eye Opening: Spontaneous  Best Verbal Response: Oriented  Best Motor Response: Obeys commands  Nay Coma Scale Score: 15                     Voice dictation software was used during the making of this note. This software is not perfect and grammatical and other typographical errors may be present. This note has not been completely proofread for errors.      Sangeetha Johnson MD  05/25/22 0635

## 2022-05-25 NOTE — ED TRIAGE NOTES
Pt post op L total knee replacement about a month ago w pain, swelling, redness to L knee (swelling unchanged over the last 1-2wks). Pt reports his home PT called his MD and MD refers pt to MD to get checked for recent dizziness worse w standing. Pt denies dyspnea, dizziness at this time. Reports yesterday had dyspnea on exertion.  A&Ox4

## 2022-05-25 NOTE — TELEPHONE ENCOUNTER
Spoke with therapist and let her know iw as going to contact the patient.  Called the patient and he is having some trouble with breathing and he felt like he was going to faint this morning so I advised him to go to emergency room for evaluation

## 2022-05-25 NOTE — ED NOTES
I have reviewed discharge instructions with the patient. The patient verbalized understanding. Patient left ED via Discharge Method: ambulatory to Home with family. Opportunity for questions and clarification provided. Patient given 0 scripts. To continue your aftercare when you leave the hospital, you may receive an automated call from our care team to check in on how you are doing. This is a free service and part of our promise to provide the best care and service to meet your aftercare needs.  If you have questions, or wish to unsubscribe from this service please call [phone]. Thank you for Choosing our [facility] Emergency Department.       Casper Canas RN  05/25/22 4154

## 2022-05-25 NOTE — ED TRIAGE NOTES
81 y.o. M who presents with CC of dizziness and SOB. Symptoms have been intermittent for several days. Recent left knee replacement 1 month ago. He denies CP, fever, chills, n/v/d. Respirations even and unlabored in triage. VS WNL. He appears in no distress. Hx of a fib. Patient evaluated initially in triage. Rapid Medical Evaluation was conducted and necessary orders have been placed. I have performed a medical screening exam.  Care will now be transferred to the provider in the back of the emergency department.   Melba Guthrie, OTONIEL - MELISSA 11:46 AM

## 2022-05-25 NOTE — TELEPHONE ENCOUNTER
Karlee Mcdowell is wanting to let you know about some concerns. He has a little bit of redness towards the bottom of his incision. He still has some pain and has some loss of appetite and nausea. He was a little short of breath today so they worked on breathing and she encouraged him to drink water.

## 2022-06-07 ENCOUNTER — OFFICE VISIT (OUTPATIENT)
Dept: ORTHOPEDIC SURGERY | Age: 82
End: 2022-06-07

## 2022-06-07 DIAGNOSIS — Z96.652 STATUS POST LEFT KNEE REPLACEMENT: Primary | ICD-10-CM

## 2022-06-07 PROCEDURE — 99024 POSTOP FOLLOW-UP VISIT: CPT | Performed by: PHYSICIAN ASSISTANT

## 2022-06-07 NOTE — PROGRESS NOTES
Post-op TKA Visit      06/07/22     Allergies   Allergen Reactions    Finasteride Other (See Comments)     Current Outpatient Medications on File Prior to Visit   Medication Sig Dispense Refill    alclomethasone (ACLOVATE) 0.05 % cream Alclometasone 0.05 % topical cream      apixaban (ELIQUIS) 5 MG TABS tablet 2 times daily      atorvastatin (LIPITOR) 20 MG tablet Nightly.       azithromycin (ZITHROMAX) 250 MG tablet Azithromycin 250 mg tablet      cetirizine (ZYRTEC) 10 MG tablet Take 10 mg by mouth daily      vitamin D (CHOLECALCIFEROL) 25 MCG (1000 UT) TABS tablet Take 25 mg by mouth daily      Cholecalciferol 50 MCG (2000 UT) TABS Take by mouth      ciprofloxacin (CIPRO) 500 MG tablet Ciprofloxacin 500 mg tablet      ciprofloxacin-dexamethasone (CIPRODEX) 0.3-0.1 % otic suspension SHAKE LIQUID AND INSTILL 4 DROPS TO RIGHT EAR THREE TIMES DAILY FOR 10 DAYS      clindamycin (CLEOCIN) 300 MG capsule Clindamycin HCl 300 mg capsule      clotrimazole-betamethasone (LOTRISONE) 1-0.05 % cream Clotrimazole-betamethasone 1 %-0.05 % topical cream      cyanocobalamin 1000 MCG tablet Take 1,000 mcg by mouth      desmopressin (DDAVP) 0.2 MG tablet Desmopressin 0.2 mg tablet      finasteride (PROSCAR) 5 MG tablet Finasteride 5 mg tablet      fluticasone (FLONASE) 50 MCG/ACT nasal spray Fluticasone propionate 50 mcg/actuation nasal spray,suspension      methocarbamol (ROBAXIN) 750 MG tablet Take 750 mg by mouth 3 times daily as needed      mometasone (ELOCON) 0.1 % cream Mometasone 0.1 % topical cream      ofloxacin (FLOXIN) 0.3 % otic solution Ofloxacin 0.3 % ear drops      oxybutynin (DITROPAN-XL) 10 MG extended release tablet Oxybutynin chloride ER 10 mg tablet,extended release 24 hr      promethazine (PHENERGAN) 12.5 MG tablet Take 12.5 mg by mouth every 6 hours as needed      scopolamine (TRANSDERM-SCOP) transdermal patch Transderm-Scop 1 mg over 3 days transdermal patch      solifenacin (VESICARE) 5 MG tablet Vesicare 5 mg tablet Take 1 tablet every day by oral route for 14 days.  tadalafil (CIALIS) 20 MG tablet Cialis 20 mg tablet      tamsulosin (FLOMAX) 0.4 MG capsule tamsulosin 0.4 mg capsule Take 2 capsules every day by oral route.  testosterone cypionate (DEPOTESTOTERONE CYPIONATE) 200 MG/ML injection 2 mLs. No current facility-administered medications on file prior to visit. 5 weeks Status Post left TKA     History: The patient returns today for post-op visit following left TKA. Their pain is improving. They are ambulating without any walking aid. They have completed home physical therapy and has not started outpatient therapy yet. They are pleased with their results thus far. Denies any new complaints today. Physical Exam:  The patient's incision is well healed. There is moderate swelling and minimal increased warmth. ROM is 10 to 95 degrees. There is no M/L or A/P instability. The calf is soft and non-tender. Neurologic and vascular exams are intact. X-Rays: AP and Lateral x-rays of left reveals a cementless TKA, Denise prosthesis. There is stable patella arthroplasty. There is good alignment and good position of the components. X-Ray Diagnosis: Satisfactory appearance left TKA    Disposition: The patient is doing well following left TKA. They will continue physical therapy exercises. The patient was advised about fall precautions and dental prophylaxis. The patient will follow up as scheduled in 5 weeks for a ROM check or sooner if needed.

## 2022-07-26 ENCOUNTER — OFFICE VISIT (OUTPATIENT)
Dept: ORTHOPEDIC SURGERY | Age: 82
End: 2022-07-26

## 2022-07-26 DIAGNOSIS — Z96.652 STATUS POST LEFT KNEE REPLACEMENT: Primary | ICD-10-CM

## 2022-07-26 NOTE — PROGRESS NOTES
Post-op TKA Visit      07/26/22     Allergies   Allergen Reactions    Finasteride Other (See Comments)     Current Outpatient Medications on File Prior to Visit   Medication Sig Dispense Refill    alclomethasone (ACLOVATE) 0.05 % cream Alclometasone 0.05 % topical cream      apixaban (ELIQUIS) 5 MG TABS tablet 2 times daily      atorvastatin (LIPITOR) 20 MG tablet Nightly.       azithromycin (ZITHROMAX) 250 MG tablet Azithromycin 250 mg tablet      cetirizine (ZYRTEC) 10 MG tablet Take 10 mg by mouth daily      vitamin D (CHOLECALCIFEROL) 25 MCG (1000 UT) TABS tablet Take 25 mg by mouth daily      Cholecalciferol 50 MCG (2000 UT) TABS Take by mouth      ciprofloxacin (CIPRO) 500 MG tablet Ciprofloxacin 500 mg tablet      ciprofloxacin-dexamethasone (CIPRODEX) 0.3-0.1 % otic suspension SHAKE LIQUID AND INSTILL 4 DROPS TO RIGHT EAR THREE TIMES DAILY FOR 10 DAYS      clindamycin (CLEOCIN) 300 MG capsule Clindamycin HCl 300 mg capsule      clotrimazole-betamethasone (LOTRISONE) 1-0.05 % cream Clotrimazole-betamethasone 1 %-0.05 % topical cream      cyanocobalamin 1000 MCG tablet Take 1,000 mcg by mouth      desmopressin (DDAVP) 0.2 MG tablet Desmopressin 0.2 mg tablet      finasteride (PROSCAR) 5 MG tablet Finasteride 5 mg tablet      fluticasone (FLONASE) 50 MCG/ACT nasal spray Fluticasone propionate 50 mcg/actuation nasal spray,suspension      methocarbamol (ROBAXIN) 750 MG tablet Take 750 mg by mouth 3 times daily as needed      mometasone (ELOCON) 0.1 % cream Mometasone 0.1 % topical cream      ofloxacin (FLOXIN) 0.3 % otic solution Ofloxacin 0.3 % ear drops      oxybutynin (DITROPAN-XL) 10 MG extended release tablet Oxybutynin chloride ER 10 mg tablet,extended release 24 hr      promethazine (PHENERGAN) 12.5 MG tablet Take 12.5 mg by mouth every 6 hours as needed      scopolamine (TRANSDERM-SCOP) transdermal patch Transderm-Scop 1 mg over 3 days transdermal patch      solifenacin (VESICARE) 5 MG tablet Vesicare 5 mg tablet Take 1 tablet every day by oral route for 14 days. tadalafil (CIALIS) 20 MG tablet Cialis 20 mg tablet      tamsulosin (FLOMAX) 0.4 MG capsule tamsulosin 0.4 mg capsule Take 2 capsules every day by oral route. testosterone cypionate (DEPOTESTOTERONE CYPIONATE) 200 MG/ML injection 2 mLs. No current facility-administered medications on file prior to visit. 12 weeks Status Post left TKA (4/25/22)    History: The patient returns today for post-op visit following left TKA for a range of motion check. Their pain is improving. They are ambulating without any walking aid. He has been going to outpatient therapy and working on range of motion which she says has improved. He states his therapist is able to get him to 116 flexion, but that causes him a fair amount of pain. Denies any new complaints today. Physical Exam:  The patient's incision is well healed. There is moderate swelling and minimal increased warmth. ROM is 3 to 100 degrees. There is no M/L or A/P instability. The calf is soft and non-tender. Neurologic and vascular exams are intact. X-Rays: No new imaging today    Disposition: The patient is doing well following left TKA. They will continue physical therapy exercises. The patient was advised about fall precautions and dental prophylaxis. The patient will follow up as scheduled in 3 months or sooner if needed.

## 2022-11-22 ENCOUNTER — OFFICE VISIT (OUTPATIENT)
Dept: ORTHOPEDIC SURGERY | Age: 82
End: 2022-11-22
Payer: MEDICARE

## 2022-11-22 DIAGNOSIS — Z96.652 STATUS POST LEFT KNEE REPLACEMENT: Primary | ICD-10-CM

## 2022-11-22 PROCEDURE — G8417 CALC BMI ABV UP PARAM F/U: HCPCS | Performed by: PHYSICIAN ASSISTANT

## 2022-11-22 PROCEDURE — G8484 FLU IMMUNIZE NO ADMIN: HCPCS | Performed by: PHYSICIAN ASSISTANT

## 2022-11-22 PROCEDURE — 1123F ACP DISCUSS/DSCN MKR DOCD: CPT | Performed by: PHYSICIAN ASSISTANT

## 2022-11-22 PROCEDURE — 1036F TOBACCO NON-USER: CPT | Performed by: ORTHOPAEDIC SURGERY

## 2022-11-22 PROCEDURE — 99213 OFFICE O/P EST LOW 20 MIN: CPT | Performed by: PHYSICIAN ASSISTANT

## 2022-11-22 PROCEDURE — G8427 DOCREV CUR MEDS BY ELIG CLIN: HCPCS | Performed by: PHYSICIAN ASSISTANT

## 2022-11-22 NOTE — PROGRESS NOTES
Post-op TKA Visit      11/22/22     Allergies   Allergen Reactions    Finasteride Other (See Comments)     Current Outpatient Medications on File Prior to Visit   Medication Sig Dispense Refill    alclomethasone (ACLOVATE) 0.05 % cream Alclometasone 0.05 % topical cream      apixaban (ELIQUIS) 5 MG TABS tablet 2 times daily      atorvastatin (LIPITOR) 20 MG tablet Nightly.       azithromycin (ZITHROMAX) 250 MG tablet Azithromycin 250 mg tablet      cetirizine (ZYRTEC) 10 MG tablet Take 10 mg by mouth daily      vitamin D (CHOLECALCIFEROL) 25 MCG (1000 UT) TABS tablet Take 25 mg by mouth daily      Cholecalciferol 50 MCG (2000 UT) TABS Take by mouth      ciprofloxacin (CIPRO) 500 MG tablet Ciprofloxacin 500 mg tablet      ciprofloxacin-dexamethasone (CIPRODEX) 0.3-0.1 % otic suspension SHAKE LIQUID AND INSTILL 4 DROPS TO RIGHT EAR THREE TIMES DAILY FOR 10 DAYS      clindamycin (CLEOCIN) 300 MG capsule Clindamycin HCl 300 mg capsule      clotrimazole-betamethasone (LOTRISONE) 1-0.05 % cream Clotrimazole-betamethasone 1 %-0.05 % topical cream      cyanocobalamin 1000 MCG tablet Take 1,000 mcg by mouth      desmopressin (DDAVP) 0.2 MG tablet Desmopressin 0.2 mg tablet      finasteride (PROSCAR) 5 MG tablet Finasteride 5 mg tablet      fluticasone (FLONASE) 50 MCG/ACT nasal spray Fluticasone propionate 50 mcg/actuation nasal spray,suspension      methocarbamol (ROBAXIN) 750 MG tablet Take 750 mg by mouth 3 times daily as needed      mometasone (ELOCON) 0.1 % cream Mometasone 0.1 % topical cream      ofloxacin (FLOXIN) 0.3 % otic solution Ofloxacin 0.3 % ear drops      oxybutynin (DITROPAN-XL) 10 MG extended release tablet Oxybutynin chloride ER 10 mg tablet,extended release 24 hr      promethazine (PHENERGAN) 12.5 MG tablet Take 12.5 mg by mouth every 6 hours as needed      scopolamine (TRANSDERM-SCOP) transdermal patch Transderm-Scop 1 mg over 3 days transdermal patch      solifenacin (VESICARE) 5 MG tablet Vesicare 5 mg tablet Take 1 tablet every day by oral route for 14 days. tadalafil (CIALIS) 20 MG tablet Cialis 20 mg tablet      tamsulosin (FLOMAX) 0.4 MG capsule tamsulosin 0.4 mg capsule Take 2 capsules every day by oral route. testosterone cypionate (DEPOTESTOTERONE CYPIONATE) 200 MG/ML injection 2 mLs. No current facility-administered medications on file prior to visit. 7 months Status Post left TKA     History: The patient returns today for post-op visit following left TKA. Their pain is improving. They are ambulating without any walking aid. They have completed physical therapy and resumed most of their normal activities. They are pleased with their results thus far. Denies any new complaints today. Physical Exam:  The patient's incision is well healed. There is mild swelling and mildly increased warmth. ROM is 0 to 115 degrees. There is no M/L or A/P instability. The calf is soft and non-tender. Neurologic and vascular exams are intact. X-Rays: AP, sunrise and Lateral x-rays of left reveals a cementless TKA, [place] prosthesis. There is no patella arthroplasty. There is good alignment and good position of the components. X-Ray Diagnosis: Satisfactory appearance left TKA    Disposition: The patient is doing well following left TKA. They will continue physical therapy exercises and normal activity as tolerated. The patient was advised about fall precautions and dental prophylaxis. The patient will follow up as scheduled in 2-3 years or sooner if needed.

## 2024-05-18 NOTE — PROGRESS NOTES
Problem: Pain  Goal: Verbalizes/displays adequate comfort level or baseline comfort level  Outcome: Progressing  Flowsheets (Taken 5/18/2024 5029)  Verbalizes/displays adequate comfort level or baseline comfort level: Encourage patient to monitor pain and request assistance      VANCO DAILY FOLLOW UP NOTE  [address] Pharmacokinetic Monitoring Service - Vancomycin    Consulting Provider: Dr. Flory Ahuja    Indication: Surgical prophylaxis  Target Concentration: Goal AUC/CORINE 400-600 mg*hr/L  Day of Therapy: 2  Additional Antimicrobials: none    Pertinent Laboratory Values: Wt Readings from Last 1 Encounters:   04/25/22 95.8 kg (211 lb 1.6 oz)     Temp Readings from Last 1 Encounters:   04/26/22 98.4 °F (36.9 °C)     No components found for: PROCAL  Recent Labs     04/26/22 0408   CREA 0.94     Estimated Creatinine Clearance: 74 mL/min (based on SCr of 0.94 mg/dL). Lab Results   Component Value Date/Time    Vancomycin, random 11.3 04/26/2022 04:08 AM       MRSA Nasal Swab: N/A. Non-respiratory infection. .      Assessment:  Date/Time Dose Concentration AUC   04/26 0408 1000 mg q12h 11.3 555   Note: Serum concentrations collected for AUC dosing may appear elevated if collected in close proximity to the dose administered, this is not necessarily an indication of toxicity    Plan:  Current dosing regimen is therapeutic  Continue current dose  Repeat vancomycin concentrations will be ordered as clinically appropriate   Pharmacy will continue to monitor patient and adjust therapy as indicated    Thank you for the consult,  WIN Ruby

## 2025-07-08 ENCOUNTER — TELEPHONE (OUTPATIENT)
Dept: ORTHOPEDIC SURGERY | Age: 85
End: 2025-07-08

## 2025-07-08 NOTE — TELEPHONE ENCOUNTER
Wanted to LM for pt but voice mail was not yet set up. Wanted to let pt know that [facility] will be out of the office 11/11/25. We can reschedule with [facility]'s PA for the same day or reschedule on another day with [facility], whichever works better for the patient.

## (undated) DEVICE — BIPOLAR SEALER 23-112-1 AQM 6.0: Brand: AQUAMANTYS ®

## (undated) DEVICE — TOTAL KNEE DR JENNINGS: Brand: MEDLINE INDUSTRIES, INC.

## (undated) DEVICE — BLADE SAW PAT RMR PILT H 46MM --

## (undated) DEVICE — 450 ML BOTTLE OF 0.05% CHLORHEXIDINE GLUCONATE IN 99.95% STERILE WATER FOR IRRIGATION, USP AND APPLICATOR.: Brand: IRRISEPT ANTIMICROBIAL WOUND LAVAGE

## (undated) DEVICE — KIT DRP FOR RIO ROBOTIC ARM ASST SYS

## (undated) DEVICE — SOLUTION IRRIG 3000ML 0.9% SOD CHL FLX CONT 0797208] ICU MEDICAL INC]

## (undated) DEVICE — SUTURE VCRL SZ 2-0 L18IN ABSRB UD CT-1 L36MM 1/2 CIR J839D

## (undated) DEVICE — SOLUTION IV 250ML 0.9% SOD CHL CLR INJ FLX BG CONT PRT CLSR

## (undated) DEVICE — CORD RETRCT SIL

## (undated) DEVICE — STERILE PRESSURE PROTECTOR PAD® FOR DE MAYO UNIVERSAL DISTRACTOR® (10/CASE): Brand: DE MAYO UNIVERSAL DISTRACTOR®

## (undated) DEVICE — SUTURE ABS ANTIBACT 1-0 CTX 24IN STRATAFIX PDS+ SXPP1A445

## (undated) DEVICE — SUT ETHBND 2 30IN LR DA GRN --

## (undated) DEVICE — Z DISCONTINUED USE 2744636  DRESSING AQUACEL 14 IN ALG W3.5XL14IN POLYUR FLM CVR W/ HYDRCOLL

## (undated) DEVICE — SYR 50ML LR LCK 1ML GRAD NSAF --

## (undated) DEVICE — GUIDEPIN ORTHOPEDIC NAVIGATION 4X140 MM 2P SCREW STRL

## (undated) DEVICE — DISPOSABLE DRAPE, STERILE, FOR A CDS-3060 5 FOOT TABLE: Brand: PEDIGO PRODUCTS, INC.

## (undated) DEVICE — BLADE SURG SAW STD S STL OSC W/ SERR EDGE DISP

## (undated) DEVICE — GUIDEPIN ORTHOPEDIC NAVIGATION 4X110 MM 2P SCREW STRL

## (undated) DEVICE — SUT VCRL + 3-0 27IN X1 VIO --

## (undated) DEVICE — KIT INT FIX FEM TIB CKPT MAKOPLASTY

## (undated) DEVICE — KIT PROC KNE TRACKING PK/1 -- VIZADISC MAKO